# Patient Record
Sex: FEMALE | Race: WHITE | NOT HISPANIC OR LATINO
[De-identification: names, ages, dates, MRNs, and addresses within clinical notes are randomized per-mention and may not be internally consistent; named-entity substitution may affect disease eponyms.]

---

## 2017-09-14 ENCOUNTER — APPOINTMENT (OUTPATIENT)
Dept: OBGYN | Facility: CLINIC | Age: 47
End: 2017-09-14
Payer: COMMERCIAL

## 2017-09-14 VITALS
BODY MASS INDEX: 31.02 KG/M2 | SYSTOLIC BLOOD PRESSURE: 110 MMHG | DIASTOLIC BLOOD PRESSURE: 80 MMHG | HEIGHT: 66 IN | WEIGHT: 193 LBS

## 2017-09-14 PROCEDURE — 99396 PREV VISIT EST AGE 40-64: CPT

## 2018-10-05 ENCOUNTER — APPOINTMENT (OUTPATIENT)
Dept: OBGYN | Facility: CLINIC | Age: 48
End: 2018-10-05
Payer: COMMERCIAL

## 2018-10-05 VITALS
HEIGHT: 66 IN | SYSTOLIC BLOOD PRESSURE: 110 MMHG | WEIGHT: 217 LBS | BODY MASS INDEX: 34.87 KG/M2 | DIASTOLIC BLOOD PRESSURE: 70 MMHG

## 2018-10-05 DIAGNOSIS — Z12.4 ENCOUNTER FOR SCREENING FOR MALIGNANT NEOPLASM OF CERVIX: ICD-10-CM

## 2018-10-05 PROCEDURE — 99396 PREV VISIT EST AGE 40-64: CPT

## 2018-10-08 LAB — HPV HIGH+LOW RISK DNA PNL CVX: NOT DETECTED

## 2018-10-11 LAB — CYTOLOGY CVX/VAG DOC THIN PREP: NORMAL

## 2018-12-05 ENCOUNTER — APPOINTMENT (OUTPATIENT)
Dept: ULTRASOUND IMAGING | Facility: CLINIC | Age: 48
End: 2018-12-05

## 2018-12-05 ENCOUNTER — APPOINTMENT (OUTPATIENT)
Dept: MAMMOGRAPHY | Facility: CLINIC | Age: 48
End: 2018-12-05

## 2019-01-02 ENCOUNTER — FORM ENCOUNTER (OUTPATIENT)
Age: 49
End: 2019-01-02

## 2019-01-03 ENCOUNTER — TRANSCRIPTION ENCOUNTER (OUTPATIENT)
Age: 49
End: 2019-01-03

## 2019-01-03 ENCOUNTER — RESULT REVIEW (OUTPATIENT)
Age: 49
End: 2019-01-03

## 2019-01-03 ENCOUNTER — APPOINTMENT (OUTPATIENT)
Dept: MAMMOGRAPHY | Facility: CLINIC | Age: 49
End: 2019-01-03
Payer: COMMERCIAL

## 2019-01-03 ENCOUNTER — OUTPATIENT (OUTPATIENT)
Dept: OUTPATIENT SERVICES | Facility: HOSPITAL | Age: 49
LOS: 1 days | End: 2019-01-03
Payer: COMMERCIAL

## 2019-01-03 DIAGNOSIS — R92.1 MAMMOGRAPHIC CALCIFICATION FOUND ON DIAGNOSTIC IMAGING OF BREAST: ICD-10-CM

## 2019-01-03 PROCEDURE — 19081 BX BREAST 1ST LESION STRTCTC: CPT | Mod: RT

## 2019-01-03 PROCEDURE — 77065 DX MAMMO INCL CAD UNI: CPT

## 2019-01-03 PROCEDURE — A4648: CPT

## 2019-01-03 PROCEDURE — 19081 BX BREAST 1ST LESION STRTCTC: CPT

## 2019-01-03 PROCEDURE — 77065 DX MAMMO INCL CAD UNI: CPT | Mod: 26,RT

## 2019-01-09 ENCOUNTER — FORM ENCOUNTER (OUTPATIENT)
Age: 49
End: 2019-01-09

## 2019-01-09 ENCOUNTER — APPOINTMENT (OUTPATIENT)
Dept: BREAST CENTER | Facility: CLINIC | Age: 49
End: 2019-01-09
Payer: COMMERCIAL

## 2019-01-09 VITALS
DIASTOLIC BLOOD PRESSURE: 90 MMHG | HEART RATE: 76 BPM | HEIGHT: 66 IN | SYSTOLIC BLOOD PRESSURE: 150 MMHG | BODY MASS INDEX: 33.75 KG/M2 | WEIGHT: 210 LBS

## 2019-01-09 DIAGNOSIS — Z78.9 OTHER SPECIFIED HEALTH STATUS: ICD-10-CM

## 2019-01-09 DIAGNOSIS — Z87.891 PERSONAL HISTORY OF NICOTINE DEPENDENCE: ICD-10-CM

## 2019-01-09 DIAGNOSIS — F41.9 ANXIETY DISORDER, UNSPECIFIED: ICD-10-CM

## 2019-01-09 PROCEDURE — 99245 OFF/OP CONSLTJ NEW/EST HI 55: CPT

## 2019-01-09 RX ORDER — MULTIVITAMIN
TABLET ORAL DAILY
Refills: 0 | Status: ACTIVE | COMMUNITY

## 2019-01-09 NOTE — PAST MEDICAL HISTORY
[Menarche Age ____] : age at menarche was [unfilled] [Amenorrhea] : amenorrhea [Total Preg ___] : G[unfilled] [Live Births ___] : P[unfilled]  [Age At Live Birth ___] : Age at live birth: [unfilled] [FreeTextEntry1] : IUD [FreeTextEntry7] : Mirena IUD

## 2019-01-09 NOTE — CONSULT LETTER
[Dear  ___] : Dear ~PIERRE, [Consult Letter:] : I had the pleasure of evaluating your patient, [unfilled]. [Please see my note below.] : Please see my note below. [Sincerely,] : Sincerely, [FreeTextEntry2] : Gabbie Faust MD [FreeTextEntry3] : Kierra Campbell MD FACS\par

## 2019-01-09 NOTE — DATA REVIEWED
[FreeTextEntry1] : Mammogram: ( [facility])  11/05/18   Findings:  Extensive post reduction scarring bilaterally.  Increasing architectural distortion in the right breast at 11 and 12:00. Grouped punctate calcifications at 11:00  right breast.  Oval asymmetry Left breast centrally seen only on the MLO view.\par \par Gene. Diagnostic Mammogram:  11/30/18   Findings:  Heterogenous calcifications right breast 11:00 which are suspicious for malignancy.  The oval asymmetry of the left breast resolves with compression imaging.  \par  \par Breast ultrasound:  11/30/18  Findings:   Bilateral architectural distortion.  No discrete masses. \par \par Right stereotactic biopsy ( 11:00 at [facility]):  1/3/19   PATHOLOGY: Moderately differentiated infiltrating ductal carcinoma., DCIS cribriform and micropapillary patterns.  ER/SC/HER 2 pending.

## 2019-01-09 NOTE — HISTORY OF PRESENT ILLNESS
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  Stereotactic biopsy revealed an invasive ductal carcinoma.   The patient presents with her significant other for a breast surgical consultation.

## 2019-01-09 NOTE — PHYSICAL EXAM
[Normocephalic] : normocephalic [Atraumatic] : atraumatic [Sclera nonicteric] : sclera nonicteric [Conjunctiva pink] : conjunctiva pink [Supple] : supple [No Supraclavicular Adenopathy] : no supraclavicular adenopathy [No Cervical Adenopathy] : no cervical adenopathy [No Thyromegaly] : no thyromegaly [Normal Sinus Rhythm] : normal sinus rhythm [No Gallops] : no gallops [No Rubs] : no pericardial rub [Examined in the supine and seated position] : examined in the supine and seated position [Symmetrical] : symmetrical [Grade 3] : Ptosis Grade 3 [No dominant masses] : no dominant masses in right breast  [No dominant masses] : no dominant masses left breast [No Nipple Retraction] : no left nipple retraction [No Nipple Discharge] : no left nipple discharge [No Axillary Lymphadenopathy] : no left axillary lymphadenopathy [Soft] : abdomen soft [No Hepato-Splenomegaly] : no hepato-splenomegaly [No Edema] : no edema [No Rashes] : no rashes [No Ulceration] : no ulceration [Bra Size: ___] : Bra Size: [unfilled] [de-identified] : Bilateral reduction mammoplasty scars

## 2019-01-10 ENCOUNTER — OUTPATIENT (OUTPATIENT)
Dept: OUTPATIENT SERVICES | Facility: HOSPITAL | Age: 49
LOS: 1 days | End: 2019-01-10
Payer: COMMERCIAL

## 2019-01-10 ENCOUNTER — APPOINTMENT (OUTPATIENT)
Dept: MRI IMAGING | Facility: CLINIC | Age: 49
End: 2019-01-10
Payer: COMMERCIAL

## 2019-01-10 DIAGNOSIS — Z00.8 ENCOUNTER FOR OTHER GENERAL EXAMINATION: ICD-10-CM

## 2019-01-10 DIAGNOSIS — C50.411 MALIGNANT NEOPLASM OF UPPER-OUTER QUADRANT OF RIGHT FEMALE BREAST: ICD-10-CM

## 2019-01-10 PROCEDURE — C8937: CPT

## 2019-01-10 PROCEDURE — 77049 MRI BREAST C-+ W/CAD BI: CPT | Mod: 26

## 2019-01-10 PROCEDURE — C8908: CPT

## 2019-01-14 ENCOUNTER — APPOINTMENT (OUTPATIENT)
Dept: OBGYN | Facility: CLINIC | Age: 49
End: 2019-01-14
Payer: COMMERCIAL

## 2019-01-14 VITALS — SYSTOLIC BLOOD PRESSURE: 140 MMHG | DIASTOLIC BLOOD PRESSURE: 80 MMHG

## 2019-01-14 PROCEDURE — 58301 REMOVE INTRAUTERINE DEVICE: CPT

## 2019-01-14 PROCEDURE — 99213 OFFICE O/P EST LOW 20 MIN: CPT | Mod: 25

## 2019-01-14 NOTE — PROCEDURE
[IUD Removal] : IUD [Mirena] : Mirena [Patient] : patient [Speculum Placed] : a speculum was placed in the vagina [Strings Exposed - Forceps] : forceps were placed in the endocervical canal to expose the strings [Tolerated Well] : the patient tolerated the procedure well [No Complications] : none [de-identified] : breast cancer

## 2019-01-14 NOTE — CHIEF COMPLAINT
[Follow Up] : follow up GYN visit [FreeTextEntry1] : pt dx with moderately differentiated ductal carcinoma last week. ER and IA+. Has Mirena IUD and I have recommended that it be removed. She had genetic testing done alsready as well as breast MRI. SHe is deciding on her management at this time.\par Other options for bc dw pt. Options limited because has to be nonhormonal (barrier methods, copper IUD, sterilization). pt states boyfriend is willing to get vasectomy.

## 2019-01-16 ENCOUNTER — APPOINTMENT (OUTPATIENT)
Age: 49
End: 2019-01-16
Payer: COMMERCIAL

## 2019-01-16 PROCEDURE — 99215 OFFICE O/P EST HI 40 MIN: CPT

## 2019-01-16 NOTE — HISTORY OF PRESENT ILLNESS
[FreeTextEntry1] : Patient had a breast MRI on 1/10/19 which showed 5.5 cm area of right breast enhancement including the biopsy site which revealed invasive ductal carcinoma.  She saw Dr. Alaniz for a plastic surgical consultation and has decided to proceed with bilateral mastectomies with JASMYNE flap reconstruction. \par She is here with her boyfriend to discuss surgery in detail.

## 2019-01-16 NOTE — DATA REVIEWED
[FreeTextEntry1] : Mammogram: ( [facility])  11/05/18   Findings:  Extensive post reduction scarring bilaterally.  Increasing architectural distortion in the right breast at 11 and 12:00. Grouped punctate calcifications at 11:00  right breast.  Oval asymmetry Left breast centrally seen only on the MLO view.\par \par Gene. Diagnostic Mammogram:  11/30/18   Findings:  Heterogenous calcifications right breast 11:00 which are suspicious for malignancy.  The oval asymmetry of the left breast resolves with compression imaging.  \par  \par Breast ultrasound:  11/30/18  Findings:   Bilateral architectural distortion.  No discrete masses. \par \par Right stereotactic biopsy ( 11:00 at [facility]):  1/3/19   PATHOLOGY: Moderately differentiated infiltrating ductal carcinoma., DCIS cribriform and micropapillary patterns.  ER-.95%, [place] - 95%, HER2 - negative.\par \par MRI:  1/1019  Findings:  Irregular enhancement of the outer central right breast spanning up to 5.5 cm in AP dimension and 2.2 cm in the transverse dimension c/w areas of architectural distortion and calcifications on mammography.   N suspicious findings in the left breast.  No adenopathy.  9 mm T2 bright lesion in the liver.

## 2019-01-16 NOTE — CONSULT LETTER
[Dear  ___] : Dear ~PIERRE, [Consult Letter:] : I had the pleasure of evaluating your patient, [unfilled]. [Please see my note below.] : Please see my note below. [Consult Closing:] : Thank you very much for allowing me to participate in the care of this patient.  If you have any questions, please do not hesitate to contact me. [Sincerely,] : Sincerely, [FreeTextEntry2] : Shwetha Hyde MD [FreeTextEntry3] : Kierra Campbell MD FACS\par  [DrEileen  ___] : Dr. GREGORY [DrEileen ___] : Dr. GREGORY

## 2019-01-23 ENCOUNTER — RESULT REVIEW (OUTPATIENT)
Age: 49
End: 2019-01-23

## 2019-01-23 ENCOUNTER — APPOINTMENT (OUTPATIENT)
Dept: CT IMAGING | Facility: CLINIC | Age: 49
End: 2019-01-23
Payer: COMMERCIAL

## 2019-01-23 ENCOUNTER — OUTPATIENT (OUTPATIENT)
Dept: OUTPATIENT SERVICES | Facility: HOSPITAL | Age: 49
LOS: 1 days | Discharge: HOME | End: 2019-01-23
Payer: COMMERCIAL

## 2019-01-23 ENCOUNTER — OUTPATIENT (OUTPATIENT)
Dept: OUTPATIENT SERVICES | Facility: HOSPITAL | Age: 49
LOS: 1 days | End: 2019-01-23
Payer: COMMERCIAL

## 2019-01-23 DIAGNOSIS — Z00.8 ENCOUNTER FOR OTHER GENERAL EXAMINATION: ICD-10-CM

## 2019-01-23 DIAGNOSIS — C50.411 MALIGNANT NEOPLASM OF UPPER-OUTER QUADRANT OF RIGHT FEMALE BREAST: ICD-10-CM

## 2019-01-23 PROCEDURE — 74174 CTA ABD&PLVS W/CONTRAST: CPT

## 2019-01-23 PROCEDURE — 88321 CONSLTJ&REPRT SLD PREP ELSWR: CPT

## 2019-01-23 PROCEDURE — 74174 CTA ABD&PLVS W/CONTRAST: CPT | Mod: 26

## 2019-01-28 LAB — SURGICAL PATHOLOGY FINAL REPORT - CH: SIGNIFICANT CHANGE UP

## 2019-02-06 ENCOUNTER — OUTPATIENT (OUTPATIENT)
Dept: OUTPATIENT SERVICES | Facility: HOSPITAL | Age: 49
LOS: 1 days | Discharge: HOME | End: 2019-02-06
Payer: COMMERCIAL

## 2019-02-06 DIAGNOSIS — C50.411 MALIGNANT NEOPLASM OF UPPER-OUTER QUADRANT OF RIGHT FEMALE BREAST: ICD-10-CM

## 2019-02-06 DIAGNOSIS — Z98.890 OTHER SPECIFIED POSTPROCEDURAL STATES: Chronic | ICD-10-CM

## 2019-02-06 LAB
ALBUMIN SERPL ELPH-MCNC: 4.1 G/DL — SIGNIFICANT CHANGE UP (ref 3.3–5)
ALLERGY+IMMUNOLOGY DIAG STUDY NOTE: SIGNIFICANT CHANGE UP
ALP SERPL-CCNC: 87 U/L — SIGNIFICANT CHANGE UP (ref 40–120)
ALT FLD-CCNC: 24 U/L — SIGNIFICANT CHANGE UP (ref 12–78)
ANION GAP SERPL CALC-SCNC: 8 MMOL/L — SIGNIFICANT CHANGE UP (ref 5–17)
AST SERPL-CCNC: 19 U/L — SIGNIFICANT CHANGE UP (ref 15–37)
BASOPHILS # BLD AUTO: 0.03 K/UL — SIGNIFICANT CHANGE UP (ref 0–0.2)
BASOPHILS NFR BLD AUTO: 0.4 % — SIGNIFICANT CHANGE UP (ref 0–2)
BILIRUB DIRECT SERPL-MCNC: 0.1 MG/DL — SIGNIFICANT CHANGE UP (ref 0–0.2)
BILIRUB SERPL-MCNC: 0.6 MG/DL — SIGNIFICANT CHANGE UP (ref 0.2–1.2)
BLD GP AB SCN SERPL QL: ABNORMAL
BUN SERPL-MCNC: 13 MG/DL — SIGNIFICANT CHANGE UP (ref 7–23)
CALCIUM SERPL-MCNC: 8.4 MG/DL — LOW (ref 8.5–10.1)
CHLORIDE SERPL-SCNC: 105 MMOL/L — SIGNIFICANT CHANGE UP (ref 96–108)
CO2 SERPL-SCNC: 25 MMOL/L — SIGNIFICANT CHANGE UP (ref 22–31)
CREAT SERPL-MCNC: 0.68 MG/DL — SIGNIFICANT CHANGE UP (ref 0.5–1.3)
DIR ANTIGLOB POLYSPECIFIC INTERPRETATION: SIGNIFICANT CHANGE UP
EOSINOPHIL # BLD AUTO: 0.07 K/UL — SIGNIFICANT CHANGE UP (ref 0–0.5)
EOSINOPHIL NFR BLD AUTO: 1 % — SIGNIFICANT CHANGE UP (ref 0–6)
GLUCOSE SERPL-MCNC: 112 MG/DL — HIGH (ref 70–99)
HCT VFR BLD CALC: 39.7 % — SIGNIFICANT CHANGE UP (ref 34.5–45)
HGB BLD-MCNC: 13.5 G/DL — SIGNIFICANT CHANGE UP (ref 11.5–15.5)
IMM GRANULOCYTES NFR BLD AUTO: 0.3 % — SIGNIFICANT CHANGE UP (ref 0–1.5)
INR BLD: 1.08 RATIO — SIGNIFICANT CHANGE UP (ref 0.88–1.16)
LYMPHOCYTES # BLD AUTO: 1.1 K/UL — SIGNIFICANT CHANGE UP (ref 1–3.3)
LYMPHOCYTES # BLD AUTO: 15.4 % — SIGNIFICANT CHANGE UP (ref 13–44)
MCHC RBC-ENTMCNC: 31.7 PG — SIGNIFICANT CHANGE UP (ref 27–34)
MCHC RBC-ENTMCNC: 34 GM/DL — SIGNIFICANT CHANGE UP (ref 32–36)
MCV RBC AUTO: 93.2 FL — SIGNIFICANT CHANGE UP (ref 80–100)
MONOCYTES # BLD AUTO: 0.68 K/UL — SIGNIFICANT CHANGE UP (ref 0–0.9)
MONOCYTES NFR BLD AUTO: 9.5 % — SIGNIFICANT CHANGE UP (ref 2–14)
NEUTROPHILS # BLD AUTO: 5.24 K/UL — SIGNIFICANT CHANGE UP (ref 1.8–7.4)
NEUTROPHILS NFR BLD AUTO: 73.4 % — SIGNIFICANT CHANGE UP (ref 43–77)
NRBC # BLD: 0 /100 WBCS — SIGNIFICANT CHANGE UP (ref 0–0)
PLATELET # BLD AUTO: 225 K/UL — SIGNIFICANT CHANGE UP (ref 150–400)
POTASSIUM SERPL-MCNC: 4 MMOL/L — SIGNIFICANT CHANGE UP (ref 3.5–5.3)
POTASSIUM SERPL-SCNC: 4 MMOL/L — SIGNIFICANT CHANGE UP (ref 3.5–5.3)
PROT SERPL-MCNC: 7.6 GM/DL — SIGNIFICANT CHANGE UP (ref 6–8.3)
PROTHROM AB SERPL-ACNC: 12 SEC — SIGNIFICANT CHANGE UP (ref 10–12.9)
RBC # BLD: 4.26 M/UL — SIGNIFICANT CHANGE UP (ref 3.8–5.2)
RBC # FLD: 12 % — SIGNIFICANT CHANGE UP (ref 10.3–14.5)
SODIUM SERPL-SCNC: 138 MMOL/L — SIGNIFICANT CHANGE UP (ref 135–145)
TYPE + AB SCN PNL BLD: SIGNIFICANT CHANGE UP
WBC # BLD: 7.14 K/UL — SIGNIFICANT CHANGE UP (ref 3.8–10.5)
WBC # FLD AUTO: 7.14 K/UL — SIGNIFICANT CHANGE UP (ref 3.8–10.5)

## 2019-02-06 PROCEDURE — 93010 ELECTROCARDIOGRAM REPORT: CPT

## 2019-02-06 PROCEDURE — 86077 PHYS BLOOD BANK SERV XMATCH: CPT

## 2019-02-06 PROCEDURE — 71046 X-RAY EXAM CHEST 2 VIEWS: CPT | Mod: 26

## 2019-02-06 NOTE — CHART NOTE - NSCHARTNOTEFT_GEN_A_CORE
Plan  1. Stop all NSAIDS, herbal supplements and vitamins for 7 days.  2. NPO at midnight.  3. Use EZ sponges as directed  4. Labs, EKG, CXR as per surgeon. STAT urine pregnancy on admission.  5. PMD visit for optimization prior to surgery as per surgeon.    Vital signs:    BP  130/81  T 98.1  P 74  R 20  O2sat 99%

## 2019-02-07 LAB — ABO RH CONFIRMATION: SIGNIFICANT CHANGE UP

## 2019-02-12 PROBLEM — Z13.79 GENETIC TESTING: Status: RESOLVED | Noted: 2019-02-12 | Resolved: 2019-02-12

## 2019-02-12 RX ORDER — SODIUM CHLORIDE 9 MG/ML
3 INJECTION INTRAMUSCULAR; INTRAVENOUS; SUBCUTANEOUS EVERY 8 HOURS
Qty: 0 | Refills: 0 | Status: DISCONTINUED | OUTPATIENT
Start: 2019-02-13 | End: 2019-02-13

## 2019-02-12 RX ORDER — ACETAMINOPHEN 500 MG
975 TABLET ORAL ONCE
Qty: 0 | Refills: 0 | Status: COMPLETED | OUTPATIENT
Start: 2019-02-13 | End: 2019-02-13

## 2019-02-12 RX ORDER — FAMOTIDINE 10 MG/ML
20 INJECTION INTRAVENOUS ONCE
Qty: 0 | Refills: 0 | Status: COMPLETED | OUTPATIENT
Start: 2019-02-13 | End: 2019-02-13

## 2019-02-13 ENCOUNTER — RESULT REVIEW (OUTPATIENT)
Age: 49
End: 2019-02-13

## 2019-02-13 ENCOUNTER — INPATIENT (INPATIENT)
Facility: HOSPITAL | Age: 49
LOS: 3 days | Discharge: HOME | End: 2019-02-17
Attending: SURGERY | Admitting: SURGERY
Payer: COMMERCIAL

## 2019-02-13 ENCOUNTER — APPOINTMENT (OUTPATIENT)
Dept: BREAST CENTER | Facility: HOSPITAL | Age: 49
End: 2019-02-13
Payer: COMMERCIAL

## 2019-02-13 VITALS
OXYGEN SATURATION: 98 % | WEIGHT: 220.9 LBS | SYSTOLIC BLOOD PRESSURE: 140 MMHG | RESPIRATION RATE: 16 BRPM | HEART RATE: 82 BPM | HEIGHT: 66 IN | DIASTOLIC BLOOD PRESSURE: 79 MMHG | TEMPERATURE: 98 F

## 2019-02-13 DIAGNOSIS — Z13.79 ENCOUNTER FOR OTHER SCREENING FOR GENETIC AND CHROMOSOMAL ANOMALIES: ICD-10-CM

## 2019-02-13 DIAGNOSIS — Z98.890 OTHER SPECIFIED POSTPROCEDURAL STATES: Chronic | ICD-10-CM

## 2019-02-13 LAB
ANION GAP SERPL CALC-SCNC: 12 MMOL/L — SIGNIFICANT CHANGE UP (ref 5–17)
BUN SERPL-MCNC: 10 MG/DL — SIGNIFICANT CHANGE UP (ref 7–23)
CALCIUM SERPL-MCNC: 7.8 MG/DL — LOW (ref 8.5–10.1)
CHLORIDE SERPL-SCNC: 105 MMOL/L — SIGNIFICANT CHANGE UP (ref 96–108)
CO2 SERPL-SCNC: 20 MMOL/L — LOW (ref 22–31)
CREAT SERPL-MCNC: 1.01 MG/DL — SIGNIFICANT CHANGE UP (ref 0.5–1.3)
GLUCOSE SERPL-MCNC: 144 MG/DL — HIGH (ref 70–99)
HCG UR QL: NEGATIVE — SIGNIFICANT CHANGE UP
HCT VFR BLD CALC: 34.2 % — LOW (ref 34.5–45)
HGB BLD-MCNC: 11.7 G/DL — SIGNIFICANT CHANGE UP (ref 11.5–15.5)
MCHC RBC-ENTMCNC: 32.1 PG — SIGNIFICANT CHANGE UP (ref 27–34)
MCHC RBC-ENTMCNC: 34.2 GM/DL — SIGNIFICANT CHANGE UP (ref 32–36)
MCV RBC AUTO: 93.7 FL — SIGNIFICANT CHANGE UP (ref 80–100)
NRBC # BLD: 0 /100 WBCS — SIGNIFICANT CHANGE UP (ref 0–0)
PLATELET # BLD AUTO: 247 K/UL — SIGNIFICANT CHANGE UP (ref 150–400)
POTASSIUM SERPL-MCNC: 4 MMOL/L — SIGNIFICANT CHANGE UP (ref 3.5–5.3)
POTASSIUM SERPL-SCNC: 4 MMOL/L — SIGNIFICANT CHANGE UP (ref 3.5–5.3)
RBC # BLD: 3.65 M/UL — LOW (ref 3.8–5.2)
RBC # FLD: 11.9 % — SIGNIFICANT CHANGE UP (ref 10.3–14.5)
SODIUM SERPL-SCNC: 137 MMOL/L — SIGNIFICANT CHANGE UP (ref 135–145)
WBC # BLD: 18.09 K/UL — HIGH (ref 3.8–10.5)
WBC # FLD AUTO: 18.09 K/UL — HIGH (ref 3.8–10.5)

## 2019-02-13 PROCEDURE — 88307 TISSUE EXAM BY PATHOLOGIST: CPT | Mod: 26

## 2019-02-13 PROCEDURE — 19303 MAST SIMPLE COMPLETE: CPT | Mod: RT

## 2019-02-13 PROCEDURE — 19303 MAST SIMPLE COMPLETE: CPT | Mod: AS,LT

## 2019-02-13 PROCEDURE — 88334 PATH CONSLTJ SURG CYTO XM EA: CPT | Mod: 26,59

## 2019-02-13 PROCEDURE — 88331 PATH CONSLTJ SURG 1 BLK 1SPC: CPT | Mod: 26

## 2019-02-13 PROCEDURE — 88300 SURGICAL PATH GROSS: CPT | Mod: 26,59

## 2019-02-13 PROCEDURE — 88360 TUMOR IMMUNOHISTOCHEM/MANUAL: CPT | Mod: 26,59

## 2019-02-13 PROCEDURE — 38525 BIOPSY/REMOVAL LYMPH NODES: CPT | Mod: RT,59

## 2019-02-13 PROCEDURE — 88304 TISSUE EXAM BY PATHOLOGIST: CPT | Mod: 26

## 2019-02-13 PROCEDURE — 38792 RA TRACER ID OF SENTINL NODE: CPT | Mod: RT

## 2019-02-13 PROCEDURE — 88305 TISSUE EXAM BY PATHOLOGIST: CPT | Mod: 26

## 2019-02-13 PROCEDURE — 38745 REMOVE ARMPIT LYMPH NODES: CPT | Mod: RT,59

## 2019-02-13 RX ORDER — FENTANYL CITRATE 50 UG/ML
50 INJECTION INTRAVENOUS
Qty: 0 | Refills: 0 | Status: DISCONTINUED | OUTPATIENT
Start: 2019-02-13 | End: 2019-02-14

## 2019-02-13 RX ORDER — HYDROMORPHONE HYDROCHLORIDE 2 MG/ML
0.5 INJECTION INTRAMUSCULAR; INTRAVENOUS; SUBCUTANEOUS
Qty: 0 | Refills: 0 | Status: DISCONTINUED | OUTPATIENT
Start: 2019-02-13 | End: 2019-02-14

## 2019-02-13 RX ORDER — ACETAMINOPHEN 500 MG
650 TABLET ORAL EVERY 6 HOURS
Qty: 0 | Refills: 0 | Status: DISCONTINUED | OUTPATIENT
Start: 2019-02-15 | End: 2019-02-17

## 2019-02-13 RX ORDER — ONDANSETRON 8 MG/1
4 TABLET, FILM COATED ORAL ONCE
Qty: 0 | Refills: 0 | Status: COMPLETED | OUTPATIENT
Start: 2019-02-13 | End: 2019-02-13

## 2019-02-13 RX ORDER — OXYCODONE HYDROCHLORIDE 5 MG/1
10 TABLET ORAL EVERY 4 HOURS
Qty: 0 | Refills: 0 | Status: DISCONTINUED | OUTPATIENT
Start: 2019-02-13 | End: 2019-02-17

## 2019-02-13 RX ORDER — KETOROLAC TROMETHAMINE 30 MG/ML
15 SYRINGE (ML) INJECTION EVERY 6 HOURS
Qty: 0 | Refills: 0 | Status: DISCONTINUED | OUTPATIENT
Start: 2019-02-14 | End: 2019-02-14

## 2019-02-13 RX ORDER — SODIUM CHLORIDE 9 MG/ML
1000 INJECTION, SOLUTION INTRAVENOUS
Qty: 0 | Refills: 0 | Status: DISCONTINUED | OUTPATIENT
Start: 2019-02-13 | End: 2019-02-14

## 2019-02-13 RX ORDER — DOCUSATE SODIUM 100 MG
100 CAPSULE ORAL THREE TIMES A DAY
Qty: 0 | Refills: 0 | Status: DISCONTINUED | OUTPATIENT
Start: 2019-02-13 | End: 2019-02-17

## 2019-02-13 RX ORDER — ENOXAPARIN SODIUM 100 MG/ML
40 INJECTION SUBCUTANEOUS EVERY 24 HOURS
Qty: 0 | Refills: 0 | Status: DISCONTINUED | OUTPATIENT
Start: 2019-02-14 | End: 2019-02-17

## 2019-02-13 RX ORDER — IBUPROFEN 200 MG
400 TABLET ORAL EVERY 8 HOURS
Qty: 0 | Refills: 0 | Status: DISCONTINUED | OUTPATIENT
Start: 2019-02-14 | End: 2019-02-17

## 2019-02-13 RX ORDER — ACETAMINOPHEN 500 MG
1000 TABLET ORAL ONCE
Qty: 0 | Refills: 0 | Status: COMPLETED | OUTPATIENT
Start: 2019-02-14 | End: 2019-02-14

## 2019-02-13 RX ORDER — HYDROMORPHONE HYDROCHLORIDE 2 MG/ML
0.5 INJECTION INTRAMUSCULAR; INTRAVENOUS; SUBCUTANEOUS EVERY 4 HOURS
Qty: 0 | Refills: 0 | Status: DISCONTINUED | OUTPATIENT
Start: 2019-02-13 | End: 2019-02-17

## 2019-02-13 RX ORDER — OXYCODONE HYDROCHLORIDE 5 MG/1
5 TABLET ORAL EVERY 4 HOURS
Qty: 0 | Refills: 0 | Status: DISCONTINUED | OUTPATIENT
Start: 2019-02-13 | End: 2019-02-17

## 2019-02-13 RX ORDER — CEFAZOLIN SODIUM 1 G
2000 VIAL (EA) INJECTION EVERY 8 HOURS
Qty: 0 | Refills: 0 | Status: COMPLETED | OUTPATIENT
Start: 2019-02-13 | End: 2019-02-14

## 2019-02-13 RX ADMIN — FAMOTIDINE 20 MILLIGRAM(S): 10 INJECTION INTRAVENOUS at 07:00

## 2019-02-13 RX ADMIN — HYDROMORPHONE HYDROCHLORIDE 0.5 MILLIGRAM(S): 2 INJECTION INTRAMUSCULAR; INTRAVENOUS; SUBCUTANEOUS at 20:40

## 2019-02-13 RX ADMIN — Medication 975 MILLIGRAM(S): at 07:00

## 2019-02-13 RX ADMIN — HYDROMORPHONE HYDROCHLORIDE 0.5 MILLIGRAM(S): 2 INJECTION INTRAMUSCULAR; INTRAVENOUS; SUBCUTANEOUS at 22:29

## 2019-02-13 RX ADMIN — Medication 100 MILLIGRAM(S): at 22:52

## 2019-02-13 RX ADMIN — ONDANSETRON 4 MILLIGRAM(S): 8 TABLET, FILM COATED ORAL at 20:40

## 2019-02-13 RX ADMIN — HYDROMORPHONE HYDROCHLORIDE 0.5 MILLIGRAM(S): 2 INJECTION INTRAMUSCULAR; INTRAVENOUS; SUBCUTANEOUS at 22:28

## 2019-02-13 RX ADMIN — SODIUM CHLORIDE 125 MILLILITER(S): 9 INJECTION, SOLUTION INTRAVENOUS at 21:00

## 2019-02-13 RX ADMIN — Medication 975 MILLIGRAM(S): at 07:01

## 2019-02-13 RX ADMIN — HYDROMORPHONE HYDROCHLORIDE 0.5 MILLIGRAM(S): 2 INJECTION INTRAMUSCULAR; INTRAVENOUS; SUBCUTANEOUS at 21:00

## 2019-02-13 NOTE — BRIEF OPERATIVE NOTE - PROCEDURE
<<-----Click on this checkbox to enter Procedure Mastectomy of left breast  02/13/2019  nipple sparing /prophylactic  Active  [email or web address]  Mastectomy with axillary sentinel node biopsy  02/13/2019  and axillary dissection  - right/nipple sparing  Active  [email or web address]

## 2019-02-13 NOTE — BRIEF OPERATIVE NOTE - PROCEDURE
<<-----Click on this checkbox to enter Procedure Breast reconstruction  02/13/2019  Bilateral breast reconstruction with JASMYNE flap and right lymphovenous anastamosis  Active  MADIHAO

## 2019-02-13 NOTE — BRIEF OPERATIVE NOTE - PRE-OP DX
Invasive ductal carcinoma of breast, female, right  02/13/2019  ER+,[place]+,HER2-  Active  Fred Thomas
Invasive ductal carcinoma of breast, female, right  02/13/2019  ER+,[place]+,HER2-  Active  Fred Thomas

## 2019-02-13 NOTE — BRIEF OPERATIVE NOTE - POST-OP DX
Invasive ductal carcinoma of breast, female, right  02/13/2019  ER+,[place]+,HER2-  Active  Fred Thomas
Invasive ductal carcinoma of breast, female, right  02/13/2019  ER+,[place]+,HER2-  Active  Fred Thomas

## 2019-02-13 NOTE — BRIEF OPERATIVE NOTE - OPERATION/FINDINGS
see op note
right axillary sentinel node and right axillary non-sentinel node - postitive on intraoperative pathology consultation

## 2019-02-13 NOTE — BRIEF OPERATIVE NOTE - SPECIMENS
none
left breast , left intranipple tissue, right breast, right intranipple tissue, right axillary sentinel node x1 , right axillary non sentinel node x1 , left breast lipoma

## 2019-02-14 LAB
ANION GAP SERPL CALC-SCNC: 8 MMOL/L — SIGNIFICANT CHANGE UP (ref 5–17)
BUN SERPL-MCNC: 10 MG/DL — SIGNIFICANT CHANGE UP (ref 7–23)
CALCIUM SERPL-MCNC: 7.6 MG/DL — LOW (ref 8.5–10.1)
CHLORIDE SERPL-SCNC: 106 MMOL/L — SIGNIFICANT CHANGE UP (ref 96–108)
CO2 SERPL-SCNC: 24 MMOL/L — SIGNIFICANT CHANGE UP (ref 22–31)
CREAT SERPL-MCNC: 0.75 MG/DL — SIGNIFICANT CHANGE UP (ref 0.5–1.3)
GLUCOSE SERPL-MCNC: 118 MG/DL — HIGH (ref 70–99)
HCT VFR BLD CALC: 28.7 % — LOW (ref 34.5–45)
HGB BLD-MCNC: 9.9 G/DL — LOW (ref 11.5–15.5)
MCHC RBC-ENTMCNC: 31.8 PG — SIGNIFICANT CHANGE UP (ref 27–34)
MCHC RBC-ENTMCNC: 34.5 GM/DL — SIGNIFICANT CHANGE UP (ref 32–36)
MCV RBC AUTO: 92.3 FL — SIGNIFICANT CHANGE UP (ref 80–100)
NRBC # BLD: 0 /100 WBCS — SIGNIFICANT CHANGE UP (ref 0–0)
PLATELET # BLD AUTO: 202 K/UL — SIGNIFICANT CHANGE UP (ref 150–400)
POTASSIUM SERPL-MCNC: 3.9 MMOL/L — SIGNIFICANT CHANGE UP (ref 3.5–5.3)
POTASSIUM SERPL-SCNC: 3.9 MMOL/L — SIGNIFICANT CHANGE UP (ref 3.5–5.3)
RBC # BLD: 3.11 M/UL — LOW (ref 3.8–5.2)
RBC # FLD: 12.2 % — SIGNIFICANT CHANGE UP (ref 10.3–14.5)
SODIUM SERPL-SCNC: 138 MMOL/L — SIGNIFICANT CHANGE UP (ref 135–145)
WBC # BLD: 10.18 K/UL — SIGNIFICANT CHANGE UP (ref 3.8–10.5)
WBC # FLD AUTO: 10.18 K/UL — SIGNIFICANT CHANGE UP (ref 3.8–10.5)

## 2019-02-14 RX ORDER — FENTANYL CITRATE 50 UG/ML
50 INJECTION INTRAVENOUS
Qty: 0 | Refills: 0 | Status: DISCONTINUED | OUTPATIENT
Start: 2019-02-15 | End: 2019-02-15

## 2019-02-14 RX ORDER — OXYCODONE HYDROCHLORIDE 5 MG/1
10 TABLET ORAL ONCE
Qty: 0 | Refills: 0 | Status: DISCONTINUED | OUTPATIENT
Start: 2019-02-14 | End: 2019-02-15

## 2019-02-14 RX ORDER — ONDANSETRON 8 MG/1
4 TABLET, FILM COATED ORAL ONCE
Qty: 0 | Refills: 0 | Status: COMPLETED | OUTPATIENT
Start: 2019-02-14 | End: 2019-02-14

## 2019-02-14 RX ORDER — SODIUM CHLORIDE 9 MG/ML
1000 INJECTION, SOLUTION INTRAVENOUS
Qty: 0 | Refills: 0 | Status: DISCONTINUED | OUTPATIENT
Start: 2019-02-14 | End: 2019-02-14

## 2019-02-14 RX ORDER — ACETAMINOPHEN 500 MG
1000 TABLET ORAL ONCE
Qty: 0 | Refills: 0 | Status: COMPLETED | OUTPATIENT
Start: 2019-02-14 | End: 2019-02-14

## 2019-02-14 RX ORDER — SODIUM CHLORIDE 9 MG/ML
1000 INJECTION, SOLUTION INTRAVENOUS
Qty: 0 | Refills: 0 | Status: DISCONTINUED | OUTPATIENT
Start: 2019-02-14 | End: 2019-02-15

## 2019-02-14 RX ORDER — OXYCODONE HYDROCHLORIDE 5 MG/1
10 TABLET ORAL ONCE
Qty: 0 | Refills: 0 | Status: DISCONTINUED | OUTPATIENT
Start: 2019-02-15 | End: 2019-02-15

## 2019-02-14 RX ORDER — ONDANSETRON 8 MG/1
4 TABLET, FILM COATED ORAL ONCE
Qty: 0 | Refills: 0 | Status: DISCONTINUED | OUTPATIENT
Start: 2019-02-15 | End: 2019-02-15

## 2019-02-14 RX ORDER — HYDROMORPHONE HYDROCHLORIDE 2 MG/ML
0.5 INJECTION INTRAMUSCULAR; INTRAVENOUS; SUBCUTANEOUS
Qty: 0 | Refills: 0 | Status: DISCONTINUED | OUTPATIENT
Start: 2019-02-14 | End: 2019-02-15

## 2019-02-14 RX ORDER — HYDROMORPHONE HYDROCHLORIDE 2 MG/ML
0.5 INJECTION INTRAMUSCULAR; INTRAVENOUS; SUBCUTANEOUS
Qty: 0 | Refills: 0 | Status: DISCONTINUED | OUTPATIENT
Start: 2019-02-15 | End: 2019-02-15

## 2019-02-14 RX ADMIN — OXYCODONE HYDROCHLORIDE 5 MILLIGRAM(S): 5 TABLET ORAL at 06:21

## 2019-02-14 RX ADMIN — HYDROMORPHONE HYDROCHLORIDE 0.5 MILLIGRAM(S): 2 INJECTION INTRAMUSCULAR; INTRAVENOUS; SUBCUTANEOUS at 21:30

## 2019-02-14 RX ADMIN — Medication 400 MILLIGRAM(S): at 11:36

## 2019-02-14 RX ADMIN — ENOXAPARIN SODIUM 40 MILLIGRAM(S): 100 INJECTION SUBCUTANEOUS at 14:11

## 2019-02-14 RX ADMIN — Medication 15 MILLIGRAM(S): at 01:26

## 2019-02-14 RX ADMIN — HYDROMORPHONE HYDROCHLORIDE 0.5 MILLIGRAM(S): 2 INJECTION INTRAMUSCULAR; INTRAVENOUS; SUBCUTANEOUS at 20:30

## 2019-02-14 RX ADMIN — ONDANSETRON 4 MILLIGRAM(S): 8 TABLET, FILM COATED ORAL at 21:10

## 2019-02-14 RX ADMIN — Medication 0.5 MILLIGRAM(S): at 21:24

## 2019-02-14 RX ADMIN — OXYCODONE HYDROCHLORIDE 10 MILLIGRAM(S): 5 TABLET ORAL at 12:30

## 2019-02-14 RX ADMIN — Medication 0.5 MILLIGRAM(S): at 00:00

## 2019-02-14 RX ADMIN — Medication 15 MILLIGRAM(S): at 06:19

## 2019-02-14 RX ADMIN — HYDROMORPHONE HYDROCHLORIDE 0.5 MILLIGRAM(S): 2 INJECTION INTRAMUSCULAR; INTRAVENOUS; SUBCUTANEOUS at 23:26

## 2019-02-14 RX ADMIN — Medication 1000 MILLIGRAM(S): at 01:25

## 2019-02-14 RX ADMIN — HYDROMORPHONE HYDROCHLORIDE 0.5 MILLIGRAM(S): 2 INJECTION INTRAMUSCULAR; INTRAVENOUS; SUBCUTANEOUS at 22:00

## 2019-02-14 RX ADMIN — SODIUM CHLORIDE 125 MILLILITER(S): 9 INJECTION, SOLUTION INTRAVENOUS at 06:21

## 2019-02-14 RX ADMIN — Medication 100 MILLIGRAM(S): at 06:18

## 2019-02-14 RX ADMIN — SODIUM CHLORIDE 75 MILLILITER(S): 9 INJECTION, SOLUTION INTRAVENOUS at 23:29

## 2019-02-14 RX ADMIN — Medication 100 MILLIGRAM(S): at 08:42

## 2019-02-14 RX ADMIN — Medication 400 MILLIGRAM(S): at 01:25

## 2019-02-14 RX ADMIN — HYDROMORPHONE HYDROCHLORIDE 0.5 MILLIGRAM(S): 2 INJECTION INTRAMUSCULAR; INTRAVENOUS; SUBCUTANEOUS at 20:45

## 2019-02-14 RX ADMIN — Medication 400 MILLIGRAM(S): at 08:42

## 2019-02-14 RX ADMIN — Medication 400 MILLIGRAM(S): at 23:01

## 2019-02-14 RX ADMIN — Medication 100 MILLIGRAM(S): at 14:11

## 2019-02-14 RX ADMIN — ONDANSETRON 4 MILLIGRAM(S): 8 TABLET, FILM COATED ORAL at 06:55

## 2019-02-14 RX ADMIN — Medication 400 MILLIGRAM(S): at 23:04

## 2019-02-14 RX ADMIN — Medication 100 MILLIGRAM(S): at 23:32

## 2019-02-14 RX ADMIN — OXYCODONE HYDROCHLORIDE 5 MILLIGRAM(S): 5 TABLET ORAL at 15:00

## 2019-02-14 RX ADMIN — OXYCODONE HYDROCHLORIDE 10 MILLIGRAM(S): 5 TABLET ORAL at 11:35

## 2019-02-14 NOTE — PROGRESS NOTE ADULT - SUBJECTIVE AND OBJECTIVE BOX
Doing well  Reports mild abdominal pain        MEDICATIONS  (STANDING):  acetaminophen  IVPB .. 1000 milliGRAM(s) IV Intermittent once  docusate sodium 100 milliGRAM(s) Oral three times a day  enoxaparin Injectable 40 milliGRAM(s) SubCutaneous every 24 hours  ibuprofen  Tablet. 400 milliGRAM(s) Oral every 8 hours  lactated ringers. 1000 milliLiter(s) (75 mL/Hr) IV Continuous <Continuous>    MEDICATIONS  (PRN):  fentaNYL    Injectable 50 MICROGram(s) IV Push every 5 minutes PRN Moderate Pain (4 - 6)  HYDROmorphone  Injectable 0.5 milliGRAM(s) IV Push every 4 hours PRN Severe Pain (7 - 10)  oxyCODONE    IR 5 milliGRAM(s) Oral every 4 hours PRN Mild Pain (1 - 3)  oxyCODONE    IR 10 milliGRAM(s) Oral every 4 hours PRN Moderate Pain (4 - 6)      Vital Signs Last 24 Hrs  T(C): 36.3 (14 Feb 2019 06:00), Max: 36.9 (14 Feb 2019 00:01)  T(F): 97.4 (14 Feb 2019 06:00), Max: 98.5 (14 Feb 2019 00:01)  HR: 106 (14 Feb 2019 06:00) (106 - 130)  BP: 118/60 (14 Feb 2019 06:00) (116/81 - 148/73)  BP(mean): --  RR: 18 (14 Feb 2019 06:00) (15 - 20)  SpO2: 100% (14 Feb 2019 06:00) (100% - 100%)    I&O's Detail    13 Feb 2019 07:01  -  14 Feb 2019 07:00  --------------------------------------------------------  IN:    lactated ringers.: 1375 mL    Other: 2100 mL  Total IN: 3475 mL    OUT:    Bulb: 20 mL    Bulb: 30 mL    Bulb: 63 mL    Bulb: 15 mL    Bulb: 18 mL    Bulb: 18 mL    Indwelling Catheter - Urethral: 1235 mL  Total OUT: 1399 mL    Total NET: 2076 mL        Exam  Breasts: Incisions clean, dry and intact.  No collections.  No erythema.  Skin viable.    Flaps:  Soft.  Capillary refill 2-3 seconds.  Viable  Viopitx:  L60  R61    Abdomen:  Incision clean, dry and intact.   No collections.  No erythema.  Skin viable.      Assessment and Plan    Transfer to [address] [place] [address]  Ambulate  ADAT  q2hr flap checks.

## 2019-02-14 NOTE — PROGRESS NOTE ADULT - SUBJECTIVE AND OBJECTIVE BOX
Called by RN for difficulty obtaining viotix reading  Reading dropped after patient sat up in a chair, then were restored after placed back in bed.  Readings then began to drop some time after that per nursing.  Flap soft and warm  + doppler and cap refill, though doppler sound quality and wave form diminished  Discussed options  Recommended return to OR for exploration and evaluation  Patient agreed and consented.  Return to OR urgently.

## 2019-02-14 NOTE — PROGRESS NOTE ADULT - SUBJECTIVE AND OBJECTIVE BOX
SURGICAL PRIGRESS NOTE     STATUS POST:  Bilateral nipple sparing mastectomies with JASMYNE flap reconstruction,  right axillary sentinel node biopsy and completion axillary node dissection. Lympha procedure    POST OPERATIVE DAY #: 1    Vital Signs Last 24 Hrs  T(C): 36.5 (14 Feb 2019 07:57), Max: 36.9 (14 Feb 2019 00:01)  T(F): 97.7 (14 Feb 2019 07:57), Max: 98.5 (14 Feb 2019 00:01)  HR: 107 (14 Feb 2019 07:57) (106 - 130)  BP: 108/61 (14 Feb 2019 07:57) (108/61 - 148/73)  BP(mean): --  RR: 18 (14 Feb 2019 07:57) (15 - 20)  SpO2: 99% (14 Feb 2019 07:57) (99% - 100%)      SUBJECTIVE:     I&O's Detail    13 Feb 2019 07:01  -  14 Feb 2019 07:00  --------------------------------------------------------  IN:    lactated ringers.: 1375 mL    Other: 2100 mL  Total IN: 3475 mL    OUT:    Bulb: 20 mL    Bulb: 30 mL    Bulb: 63 mL    Bulb: 15 mL    Bulb: 18 mL    Bulb: 18 mL    Indwelling Catheter - Urethral: 1235 mL  Total OUT: 1399 mL    Total NET: 2076 mL          MEDICATIONS  (STANDING):  acetaminophen  IVPB .. 1000 milliGRAM(s) IV Intermittent once  docusate sodium 100 milliGRAM(s) Oral three times a day  enoxaparin Injectable 40 milliGRAM(s) SubCutaneous every 24 hours  ibuprofen  Tablet. 400 milliGRAM(s) Oral every 8 hours  lactated ringers. 1000 milliLiter(s) (75 mL/Hr) IV Continuous <Continuous>    MEDICATIONS  (PRN):  HYDROmorphone  Injectable 0.5 milliGRAM(s) IV Push every 4 hours PRN Severe Pain (7 - 10)  oxyCODONE    IR 5 milliGRAM(s) Oral every 4 hours PRN Mild Pain (1 - 3)  oxyCODONE    IR 10 milliGRAM(s) Oral every 4 hours PRN Moderate Pain (4 - 6)      LABS:                        9.9    10.18 )-----------( 202      ( 14 Feb 2019 08:21 )             28.7     02-14    138  |  106  |  10  ----------------------------<  118<H>  3.9   |  24  |  0.75    Ca    7.6<L>      14 Feb 2019 08:21

## 2019-02-14 NOTE — PROGRESS NOTE ADULT - BREASTS COMMENTS
Bilateral mastectomy flaps soft.  Nipple areola complexes viable. Vioptix - 63% bilatearlly. Mild ecchymosis.

## 2019-02-15 ENCOUNTER — TRANSCRIPTION ENCOUNTER (OUTPATIENT)
Age: 49
End: 2019-02-15

## 2019-02-15 LAB
ANION GAP SERPL CALC-SCNC: 5 MMOL/L — SIGNIFICANT CHANGE UP (ref 5–17)
BUN SERPL-MCNC: 5 MG/DL — LOW (ref 7–23)
CALCIUM SERPL-MCNC: 8.1 MG/DL — LOW (ref 8.5–10.1)
CHLORIDE SERPL-SCNC: 105 MMOL/L — SIGNIFICANT CHANGE UP (ref 96–108)
CO2 SERPL-SCNC: 25 MMOL/L — SIGNIFICANT CHANGE UP (ref 22–31)
CREAT SERPL-MCNC: 0.48 MG/DL — LOW (ref 0.5–1.3)
GLUCOSE SERPL-MCNC: 115 MG/DL — HIGH (ref 70–99)
HCT VFR BLD CALC: 25 % — LOW (ref 34.5–45)
HGB BLD-MCNC: 8.7 G/DL — LOW (ref 11.5–15.5)
MCHC RBC-ENTMCNC: 32.8 PG — SIGNIFICANT CHANGE UP (ref 27–34)
MCHC RBC-ENTMCNC: 34.8 GM/DL — SIGNIFICANT CHANGE UP (ref 32–36)
MCV RBC AUTO: 94.3 FL — SIGNIFICANT CHANGE UP (ref 80–100)
NRBC # BLD: 0 /100 WBCS — SIGNIFICANT CHANGE UP (ref 0–0)
PLATELET # BLD AUTO: 173 K/UL — SIGNIFICANT CHANGE UP (ref 150–400)
POTASSIUM SERPL-MCNC: 4 MMOL/L — SIGNIFICANT CHANGE UP (ref 3.5–5.3)
POTASSIUM SERPL-SCNC: 4 MMOL/L — SIGNIFICANT CHANGE UP (ref 3.5–5.3)
RBC # BLD: 2.65 M/UL — LOW (ref 3.8–5.2)
RBC # FLD: 12.3 % — SIGNIFICANT CHANGE UP (ref 10.3–14.5)
SODIUM SERPL-SCNC: 135 MMOL/L — SIGNIFICANT CHANGE UP (ref 135–145)
WBC # BLD: 12.22 K/UL — HIGH (ref 3.8–10.5)
WBC # FLD AUTO: 12.22 K/UL — HIGH (ref 3.8–10.5)

## 2019-02-15 RX ORDER — SODIUM CHLORIDE 9 MG/ML
1000 INJECTION, SOLUTION INTRAVENOUS
Qty: 0 | Refills: 0 | Status: DISCONTINUED | OUTPATIENT
Start: 2019-02-15 | End: 2019-02-15

## 2019-02-15 RX ORDER — ZOLPIDEM TARTRATE 10 MG/1
5 TABLET ORAL AT BEDTIME
Qty: 0 | Refills: 0 | Status: DISCONTINUED | OUTPATIENT
Start: 2019-02-15 | End: 2019-02-17

## 2019-02-15 RX ADMIN — Medication 100 MILLIGRAM(S): at 05:17

## 2019-02-15 RX ADMIN — Medication 650 MILLIGRAM(S): at 21:11

## 2019-02-15 RX ADMIN — OXYCODONE HYDROCHLORIDE 5 MILLIGRAM(S): 5 TABLET ORAL at 11:27

## 2019-02-15 RX ADMIN — Medication 650 MILLIGRAM(S): at 20:24

## 2019-02-15 RX ADMIN — Medication 650 MILLIGRAM(S): at 14:50

## 2019-02-15 RX ADMIN — Medication 400 MILLIGRAM(S): at 20:23

## 2019-02-15 RX ADMIN — Medication 100 MILLIGRAM(S): at 14:20

## 2019-02-15 RX ADMIN — OXYCODONE HYDROCHLORIDE 10 MILLIGRAM(S): 5 TABLET ORAL at 23:46

## 2019-02-15 RX ADMIN — Medication 400 MILLIGRAM(S): at 00:00

## 2019-02-15 RX ADMIN — Medication 400 MILLIGRAM(S): at 11:45

## 2019-02-15 RX ADMIN — HYDROMORPHONE HYDROCHLORIDE 0.5 MILLIGRAM(S): 2 INJECTION INTRAMUSCULAR; INTRAVENOUS; SUBCUTANEOUS at 00:00

## 2019-02-15 RX ADMIN — ENOXAPARIN SODIUM 40 MILLIGRAM(S): 100 INJECTION SUBCUTANEOUS at 14:20

## 2019-02-15 RX ADMIN — Medication 650 MILLIGRAM(S): at 08:28

## 2019-02-15 RX ADMIN — OXYCODONE HYDROCHLORIDE 5 MILLIGRAM(S): 5 TABLET ORAL at 11:45

## 2019-02-15 RX ADMIN — Medication 400 MILLIGRAM(S): at 05:17

## 2019-02-15 RX ADMIN — Medication 400 MILLIGRAM(S): at 11:27

## 2019-02-15 RX ADMIN — Medication 100 MILLIGRAM(S): at 22:51

## 2019-02-15 RX ADMIN — Medication 650 MILLIGRAM(S): at 05:13

## 2019-02-15 RX ADMIN — OXYCODONE HYDROCHLORIDE 10 MILLIGRAM(S): 5 TABLET ORAL at 22:51

## 2019-02-15 RX ADMIN — Medication 400 MILLIGRAM(S): at 21:11

## 2019-02-15 RX ADMIN — Medication 650 MILLIGRAM(S): at 14:20

## 2019-02-15 RX ADMIN — Medication 400 MILLIGRAM(S): at 05:13

## 2019-02-15 NOTE — DISCHARGE NOTE ADULT - CARE PLAN
Principal Discharge DX:	Malignant neoplasm of upper-outer quadrant of right breast in female, estrogen receptor positive  Goal:	Drain care at home  Assessment and plan of treatment:	Patient and her fiance are capable of taking care of drains

## 2019-02-15 NOTE — DISCHARGE NOTE ADULT - PATIENT PORTAL LINK FT
You can access the [facility] Patient Portal, offered by [facility] [place], by registering with the following website: [email or web address]

## 2019-02-15 NOTE — DISCHARGE NOTE ADULT - MEDICATION SUMMARY - MEDICATIONS TO TAKE
I will START or STAY ON the medications listed below when I get home from the hospital:    Xanax 0.25 mg oral tablet  -- 1 tab(s) by mouth once a day (at bedtime), As Needed  -- Indication: For anxiety I will START or STAY ON the medications listed below when I get home from the hospital:    oxyCODONE 5 mg oral tablet  -- 1-2 tab(s) by mouth every 4 hours, As needed for pain  -- Indication: For pain    Xanax 0.25 mg oral tablet  -- 1 tab(s) by mouth once a day (at bedtime), As Needed  -- Indication: For anxiety

## 2019-02-15 NOTE — DISCHARGE NOTE ADULT - CARE PROVIDER_API CALL
Kierra Campbell)  Surgery  [address], [place] [address]  Phone: [phone]  Fax: [phone]  Follow Up Time:     Deepak Alaniz)  Plastic Surgery; Surgery  [address], [address]  [place] [address]  Phone: [phone]  Fax: [phone]  Follow Up Time:

## 2019-02-15 NOTE — DISCHARGE NOTE ADULT - HOSPITAL COURSE
Patient underwent bilateral nipple sparing mastectomies/JASMYNE flap reconstruction as well as a right completion level I/II right axillary node dissection for a diagnosis of invasive ductal carcinoma metastatic to axillary nodes .  Patient required a return to OR with Dr. Alaniz because of poor Vioptix signal of the right mastectomy flap which was concerning for poor blood flow to the flap.  No abnormalities were found at the time of surgery .  Postoperative course was otherwise uneventful.

## 2019-02-15 NOTE — PROGRESS NOTE ADULT - SUBJECTIVE AND OBJECTIVE BOX
Doing well  No issues        MEDICATIONS  (STANDING):  acetaminophen   Tablet .. 650 milliGRAM(s) Oral every 6 hours  docusate sodium 100 milliGRAM(s) Oral three times a day  enoxaparin Injectable 40 milliGRAM(s) SubCutaneous every 24 hours  ibuprofen  Tablet. 400 milliGRAM(s) Oral every 8 hours    MEDICATIONS  (PRN):  fentaNYL    Injectable 50 MICROGram(s) IV Push every 10 minutes PRN Severe Pain (7 - 10)  HYDROmorphone  Injectable 0.5 milliGRAM(s) IV Push every 10 minutes PRN Moderate Pain (4 - 6)  HYDROmorphone  Injectable 0.5 milliGRAM(s) IV Push every 4 hours PRN Severe Pain (7 - 10)  HYDROmorphone  Injectable 0.5 milliGRAM(s) IV Push every 10 minutes PRN Moderate Pain (4 - 6)  LORazepam   Injectable 0.5 milliGRAM(s) IV Push once PRN Anxiety  LORazepam   Injectable 0.5 milliGRAM(s) IV Push every 2 hours PRN Anxiety  ondansetron Injectable 4 milliGRAM(s) IV Push once PRN Nausea and/or Vomiting  oxyCODONE    IR 10 milliGRAM(s) Oral once PRN Severe Pain (7 - 10)  oxyCODONE    IR 10 milliGRAM(s) Oral once PRN Severe Pain (7 - 10)  oxyCODONE    IR 5 milliGRAM(s) Oral every 4 hours PRN Mild Pain (1 - 3)  oxyCODONE    IR 10 milliGRAM(s) Oral every 4 hours PRN Moderate Pain (4 - 6)      Vital Signs Last 24 Hrs  T(C): 36.8 (15 Feb 2019 04:16), Max: 37.5 (14 Feb 2019 20:26)  T(F): 98.3 (15 Feb 2019 04:16), Max: 99.5 (14 Feb 2019 20:26)  HR: 97 (15 Feb 2019 04:16) (93 - 118)  BP: 103/67 (15 Feb 2019 04:16) (103/64 - 127/68)  BP(mean): --  RR: 17 (15 Feb 2019 04:16) (14 - 18)  SpO2: 96% (15 Feb 2019 04:16) (95% - 99%)    I&O's Detail    13 Feb 2019 07:01  -  14 Feb 2019 07:00  --------------------------------------------------------  IN:    lactated ringers.: 1375 mL    Other: 2100 mL  Total IN: 3475 mL    OUT:    Bulb: 20 mL    Bulb: 30 mL    Bulb: 63 mL    Bulb: 15 mL    Bulb: 18 mL    Bulb: 18 mL    Indwelling Catheter - Urethral: 1235 mL  Total OUT: 1399 mL    Total NET: 2076 mL      14 Feb 2019 07:01  -  15 Feb 2019 06:17  --------------------------------------------------------  IN:    Other: 750 mL  Total IN: 750 mL    OUT:    Bulb: 18 mL    Bulb: 28 mL    Bulb: 30 mL    Bulb: 65 mL    Bulb: 45 mL    Bulb: 90 mL    Voided: 2250 mL  Total OUT: 2526 mL    Total NET: -1776 mL              Exam  Breasts: Incisions clean, dry and intact.  No collections.  No erythema.  Skin viable.    Flaps:  Soft.  Capillary refill 2-3 seconds.  Viable  Viopitx:  69L  69R    Abdomen:  Incision clean, dry and intact.   No collections.  No erythema.  Skin viable.      Assessment and Plan  ambulate  ADAT  Vioptix

## 2019-02-15 NOTE — PROGRESS NOTE ADULT - SUBJECTIVE AND OBJECTIVE BOX
SURGICAL PROGRESS NOTE    STATUS POST:   Bialtearl nipple sparing mastectomies with JASMYNE flap, right sentinel node biopsy, completion axillary dissection    POST OPERATIVE DAY #: 2    Vital Signs Last 24 Hrs  T(C): 36.9 (15 Feb 2019 08:18), Max: 37.5 (14 Feb 2019 20:26)  T(F): 98.4 (15 Feb 2019 08:18), Max: 99.5 (14 Feb 2019 20:26)  HR: 92 (15 Feb 2019 08:18) (92 - 118)  BP: 105/64 (15 Feb 2019 08:18) (103/64 - 127/68)  BP(mean): --  RR: 17 (15 Feb 2019 08:18) (14 - 18)  SpO2: 95% (15 Feb 2019 08:18) (95% - 99%)      SUBJECTIVE:     I&O's Detail    14 Feb 2019 07:01  -  15 Feb 2019 07:00  --------------------------------------------------------  IN:    Other: 750 mL  Total IN: 750 mL    OUT:    Bulb: 38 mL    Bulb: 85 mL    Bulb: 28 mL    Bulb: 60 mL    Bulb: 120 mL    Bulb: 75 mL    Voided: 3050 mL  Total OUT: 3456 mL    Total NET: -2706 mL      15 Feb 2019 07:01  -  15 Feb 2019 12:10  --------------------------------------------------------  IN:  Total IN: 0 mL    OUT:    Bulb: 15 mL    Bulb: 20 mL    Bulb: 5 mL    Bulb: 30 mL    Bulb: 5 mL    Bulb: 20 mL    Voided: 600 mL  Total OUT: 695 mL    Total NET: -695 mL          MEDICATIONS  (STANDING):  acetaminophen   Tablet .. 650 milliGRAM(s) Oral every 6 hours  docusate sodium 100 milliGRAM(s) Oral three times a day  enoxaparin Injectable 40 milliGRAM(s) SubCutaneous every 24 hours  ibuprofen  Tablet. 400 milliGRAM(s) Oral every 8 hours    MEDICATIONS  (PRN):  HYDROmorphone  Injectable 0.5 milliGRAM(s) IV Push every 4 hours PRN Severe Pain (7 - 10)  LORazepam   Injectable 0.5 milliGRAM(s) IV Push every 2 hours PRN Anxiety  oxyCODONE    IR 5 milliGRAM(s) Oral every 4 hours PRN Mild Pain (1 - 3)  oxyCODONE    IR 10 milliGRAM(s) Oral every 4 hours PRN Moderate Pain (4 - 6)      LABS:                        8.7    12.22 )-----------( 173      ( 15 Feb 2019 07:53 )             25.0     02-15    135  |  105  |  5<L>  ----------------------------<  115<H>  4.0   |  25  |  0.48<L>    Ca    8.1<L>      15 Feb 2019 07:53            RADIOLOGY & ADDITIONAL STUDIES:

## 2019-02-16 RX ORDER — OXYCODONE HYDROCHLORIDE 5 MG/1
1 TABLET ORAL
Qty: 0 | Refills: 0 | COMMUNITY
Start: 2019-02-16

## 2019-02-16 RX ADMIN — ENOXAPARIN SODIUM 40 MILLIGRAM(S): 100 INJECTION SUBCUTANEOUS at 13:46

## 2019-02-16 RX ADMIN — Medication 650 MILLIGRAM(S): at 06:05

## 2019-02-16 RX ADMIN — Medication 100 MILLIGRAM(S): at 13:46

## 2019-02-16 RX ADMIN — OXYCODONE HYDROCHLORIDE 5 MILLIGRAM(S): 5 TABLET ORAL at 16:54

## 2019-02-16 RX ADMIN — ZOLPIDEM TARTRATE 5 MILLIGRAM(S): 10 TABLET ORAL at 00:13

## 2019-02-16 RX ADMIN — Medication 100 MILLIGRAM(S): at 22:15

## 2019-02-16 RX ADMIN — Medication 650 MILLIGRAM(S): at 18:44

## 2019-02-16 RX ADMIN — Medication 400 MILLIGRAM(S): at 06:05

## 2019-02-16 RX ADMIN — OXYCODONE HYDROCHLORIDE 5 MILLIGRAM(S): 5 TABLET ORAL at 15:21

## 2019-02-16 RX ADMIN — Medication 400 MILLIGRAM(S): at 15:19

## 2019-02-16 RX ADMIN — Medication 400 MILLIGRAM(S): at 22:15

## 2019-02-16 RX ADMIN — Medication 100 MILLIGRAM(S): at 05:53

## 2019-02-16 RX ADMIN — Medication 650 MILLIGRAM(S): at 05:52

## 2019-02-16 RX ADMIN — Medication 400 MILLIGRAM(S): at 23:31

## 2019-02-16 RX ADMIN — Medication 400 MILLIGRAM(S): at 05:53

## 2019-02-16 RX ADMIN — OXYCODONE HYDROCHLORIDE 5 MILLIGRAM(S): 5 TABLET ORAL at 22:16

## 2019-02-16 RX ADMIN — Medication 650 MILLIGRAM(S): at 12:10

## 2019-02-16 RX ADMIN — Medication 650 MILLIGRAM(S): at 00:14

## 2019-02-16 RX ADMIN — Medication 650 MILLIGRAM(S): at 13:48

## 2019-02-16 RX ADMIN — OXYCODONE HYDROCHLORIDE 5 MILLIGRAM(S): 5 TABLET ORAL at 23:31

## 2019-02-16 RX ADMIN — Medication 400 MILLIGRAM(S): at 13:46

## 2019-02-16 RX ADMIN — ZOLPIDEM TARTRATE 5 MILLIGRAM(S): 10 TABLET ORAL at 22:15

## 2019-02-16 RX ADMIN — Medication 650 MILLIGRAM(S): at 20:08

## 2019-02-16 NOTE — PROGRESS NOTE ADULT - SUBJECTIVE AND OBJECTIVE BOX
Doing well  No issues  Pain controlled      MEDICATIONS  (STANDING):  acetaminophen   Tablet .. 650 milliGRAM(s) Oral every 6 hours  docusate sodium 100 milliGRAM(s) Oral three times a day  enoxaparin Injectable 40 milliGRAM(s) SubCutaneous every 24 hours  ibuprofen  Tablet. 400 milliGRAM(s) Oral every 8 hours    MEDICATIONS  (PRN):  HYDROmorphone  Injectable 0.5 milliGRAM(s) IV Push every 4 hours PRN Severe Pain (7 - 10)  LORazepam   Injectable 0.5 milliGRAM(s) IV Push every 2 hours PRN Anxiety  oxyCODONE    IR 5 milliGRAM(s) Oral every 4 hours PRN Mild Pain (1 - 3)  oxyCODONE    IR 10 milliGRAM(s) Oral every 4 hours PRN Moderate Pain (4 - 6)  zolpidem 5 milliGRAM(s) Oral at bedtime PRN Insomnia      Vital Signs Last 24 Hrs  T(C): 36.5 (16 Feb 2019 05:59), Max: 37.3 (15 Feb 2019 20:09)  T(F): 97.7 (16 Feb 2019 05:59), Max: 99.1 (15 Feb 2019 20:09)  HR: 80 (16 Feb 2019 05:59) (80 - 108)  BP: 103/65 (16 Feb 2019 05:59) (101/58 - 126/72)  BP(mean): --  RR: 16 (16 Feb 2019 05:59) (16 - 17)  SpO2: 99% (16 Feb 2019 05:59) (95% - 99%)    I&O's Detail    15 Feb 2019 07:01  -  16 Feb 2019 07:00  --------------------------------------------------------  IN:  Total IN: 0 mL    OUT:    Bulb: 110 mL    Bulb: 50 mL    Bulb: 45 mL    Bulb: 65 mL    Bulb: 45 mL    Bulb: 58 mL    Voided: 1600 mL  Total OUT: 1973 mL    Total NET: -1973 mL              Exam  Breasts: Incisions clean, dry and intact.  No collections.  No erythema.  Skin viable.    Flaps:  Soft.  Capillary refill 2-3 seconds.  Viable    Abdomen:  Incision clean, dry and intact.   No collections.  No erythema.  Skin viable.      Assessment and Plan  DC home later today or tomorrow  Follow up Tuesday  Drain care teaching

## 2019-02-16 NOTE — PROGRESS NOTE ADULT - ASSESSMENT
48 year old female with invasive ductal carcinoma of the right breast metastatic to axillary nodes.     Patient with mild to moderate postop pain.  Has been OOB ambulating.  Tolerating regular diet.  Probable discharge tomorrow.
48 year old female s/p bilateral mastectomies for right breast carcinoma with axillary metastases.  Patient is ambulating and tolerating a regular diet.  Anticipated discharge today/tomorrow.  Discharge as per Plastics.
48 year old female with invasive ductal carcinoma of the right breast.  Both the sentinel node and nonsentinel node (1) were positive for metastases on intraoperative pathology consultation.  Therefore, a level I/II completion axillary dissection and LYMPHA procedure were performed.  Patient doing well on POD # 1. She has moderate postop pain.  Analgesia as needed.  OOB to chair today.

## 2019-02-16 NOTE — PROGRESS NOTE ADULT - SUBJECTIVE AND OBJECTIVE BOX
SURGICAL PROGRESS NOTE    STATUS POST:  Bilateral Nipple sparing mastectomies with JASMYNE flap    POST OPERATIVE DAY #: 3    Vital Signs Last 24 Hrs  T(C): 36.5 (16 Feb 2019 05:59), Max: 37.3 (15 Feb 2019 20:09)  T(F): 97.7 (16 Feb 2019 05:59), Max: 99.1 (15 Feb 2019 20:09)  HR: 80 (16 Feb 2019 05:59) (80 - 108)  BP: 103/65 (16 Feb 2019 05:59) (101/58 - 126/72)  BP(mean): --  RR: 16 (16 Feb 2019 05:59) (16 - 17)  SpO2: 99% (16 Feb 2019 05:59) (95% - 99%)      SUBJECTIVE:     I&O's Detail    15 Feb 2019 07:01  -  16 Feb 2019 07:00  --------------------------------------------------------  IN:  Total IN: 0 mL    OUT:    Bulb: 110 mL    Bulb: 50 mL    Bulb: 45 mL    Bulb: 65 mL    Bulb: 45 mL    Bulb: 58 mL    Voided: 1600 mL  Total OUT: 1973 mL    Total NET: -1973 mL          MEDICATIONS  (STANDING):  acetaminophen   Tablet .. 650 milliGRAM(s) Oral every 6 hours  docusate sodium 100 milliGRAM(s) Oral three times a day  enoxaparin Injectable 40 milliGRAM(s) SubCutaneous every 24 hours  ibuprofen  Tablet. 400 milliGRAM(s) Oral every 8 hours    MEDICATIONS  (PRN):  HYDROmorphone  Injectable 0.5 milliGRAM(s) IV Push every 4 hours PRN Severe Pain (7 - 10)  LORazepam   Injectable 0.5 milliGRAM(s) IV Push every 2 hours PRN Anxiety  oxyCODONE    IR 5 milliGRAM(s) Oral every 4 hours PRN Mild Pain (1 - 3)  oxyCODONE    IR 10 milliGRAM(s) Oral every 4 hours PRN Moderate Pain (4 - 6)  zolpidem 5 milliGRAM(s) Oral at bedtime PRN Insomnia      LABS:                        8.7    12.22 )-----------( 173      ( 15 Feb 2019 07:53 )             25.0     02-15    135  |  105  |  5<L>  ----------------------------<  115<H>  4.0   |  25  |  0.48<L>    Ca    8.1<L>      15 Feb 2019 07:53            RADIOLOGY & ADDITIONAL STUDIES:

## 2019-02-17 VITALS
HEART RATE: 81 BPM | OXYGEN SATURATION: 100 % | TEMPERATURE: 98 F | DIASTOLIC BLOOD PRESSURE: 76 MMHG | RESPIRATION RATE: 18 BRPM | SYSTOLIC BLOOD PRESSURE: 130 MMHG

## 2019-02-17 RX ADMIN — Medication 650 MILLIGRAM(S): at 03:29

## 2019-02-17 RX ADMIN — Medication 650 MILLIGRAM(S): at 08:25

## 2019-02-17 RX ADMIN — Medication 650 MILLIGRAM(S): at 04:50

## 2019-02-17 RX ADMIN — OXYCODONE HYDROCHLORIDE 5 MILLIGRAM(S): 5 TABLET ORAL at 09:36

## 2019-02-17 RX ADMIN — Medication 400 MILLIGRAM(S): at 06:41

## 2019-02-17 RX ADMIN — Medication 100 MILLIGRAM(S): at 06:41

## 2019-02-17 NOTE — PROGRESS NOTE ADULT - SUBJECTIVE AND OBJECTIVE BOX
no c/o    b/l flaps well perfused with good cap refill  abd c/d/i  JPs serosang    Doing well  d/c home

## 2019-02-19 LAB — SURGICAL PATHOLOGY FINAL REPORT - CH: SIGNIFICANT CHANGE UP

## 2019-02-20 NOTE — PHYSICAL EXAM
[Normocephalic] : normocephalic [Atraumatic] : atraumatic [Sclera nonicteric] : sclera nonicteric [Conjunctiva pink] : conjunctiva pink [Supple] : supple [No Supraclavicular Adenopathy] : no supraclavicular adenopathy [No Cervical Adenopathy] : no cervical adenopathy [No Thyromegaly] : no thyromegaly [Normal Sinus Rhythm] : normal sinus rhythm [No Gallops] : no gallops [No Rubs] : no pericardial rub [Examined in the supine and seated position] : examined in the supine and seated position [Symmetrical] : symmetrical [Bra Size: ___] : Bra Size: [unfilled] [Grade 3] : Ptosis Grade 3 [No dominant masses] : no dominant masses in right breast  [No dominant masses] : no dominant masses left breast [No Nipple Retraction] : no left nipple retraction [No Nipple Discharge] : no left nipple discharge [No Axillary Lymphadenopathy] : no left axillary lymphadenopathy [Soft] : abdomen soft [No Hepato-Splenomegaly] : no hepato-splenomegaly [No Edema] : no edema [No Rashes] : no rashes [No Ulceration] : no ulceration [de-identified] : Bilateral reduction mammoplasty scars

## 2019-02-20 NOTE — DATA REVIEWED
[FreeTextEntry1] : Mammogram: ( [facility])  11/05/18   Findings:  Extensive post reduction scarring bilaterally.  Increasing architectural distortion in the right breast at 11 and 12:00. Grouped punctate calcifications at 11:00  right breast.  Oval asymmetry Left breast centrally seen only on the MLO view.\par \par Gene. Diagnostic Mammogram:  11/30/18   Findings:  Heterogenous calcifications right breast 11:00 which are suspicious for malignancy.  The oval asymmetry of the left breast resolves with compression imaging.  \par  \par Breast ultrasound:  11/30/18  Findings:   Bilateral architectural distortion.  No discrete masses. \par \par Right stereotactic biopsy ( 11:00 at [facility]):  1/3/19   PATHOLOGY: Moderately differentiated infiltrating ductal carcinoma., DCIS cribriform and micropapillary patterns.  ER-.95%, DE - 95%, HER2 - negative.\par \par MRI:  1/1019  Findings:  Irregular enhancement of the outer central right breast spanning up to 5.5 cm in AP dimension and 2.2 cm in the transverse dimension c/w areas of architectural distortion and calcifications on mammography.   N suspicious findings in the left breast.  No adenopathy.  9 mm T2 bright lesion in the liver.

## 2019-02-20 NOTE — HISTORY OF PRESENT ILLNESS
[FreeTextEntry1] : 48 year old female diagnosed with an invasive ductal carcinoma of the right breast measuring 5 cm on MRI is being admitted to [facility] for bilateral mastectomies with JASMYNE flap reconstruction, right sentinel node biopsy and possible axillary dissection on 2/13/19.

## 2019-02-21 ENCOUNTER — APPOINTMENT (OUTPATIENT)
Dept: BREAST CENTER | Facility: CLINIC | Age: 49
End: 2019-02-21
Payer: COMMERCIAL

## 2019-02-21 PROCEDURE — 99024 POSTOP FOLLOW-UP VISIT: CPT

## 2019-02-22 ENCOUNTER — FORM ENCOUNTER (OUTPATIENT)
Age: 49
End: 2019-02-22

## 2019-02-22 DIAGNOSIS — Z17.1 ESTROGEN RECEPTOR NEGATIVE STATUS [ER-]: ICD-10-CM

## 2019-02-22 DIAGNOSIS — C50.411 MALIGNANT NEOPLASM OF UPPER-OUTER QUADRANT OF RIGHT FEMALE BREAST: ICD-10-CM

## 2019-02-22 DIAGNOSIS — Z40.01 ENCOUNTER FOR PROPHYLACTIC REMOVAL OF BREAST: ICD-10-CM

## 2019-02-22 DIAGNOSIS — C77.3 SECONDARY AND UNSPECIFIED MALIGNANT NEOPLASM OF AXILLA AND UPPER LIMB LYMPH NODES: ICD-10-CM

## 2019-02-22 DIAGNOSIS — K42.9 UMBILICAL HERNIA WITHOUT OBSTRUCTION OR GANGRENE: ICD-10-CM

## 2019-02-22 DIAGNOSIS — Z87.891 PERSONAL HISTORY OF NICOTINE DEPENDENCE: ICD-10-CM

## 2019-02-22 PROBLEM — Z30.432 ENCOUNTER FOR IUD REMOVAL: Status: RESOLVED | Noted: 2019-01-14 | Resolved: 2019-02-22

## 2019-02-22 PROBLEM — N64.89 BREAST ASYMMETRY: Status: RESOLVED | Noted: 2018-11-10 | Resolved: 2019-02-22

## 2019-02-22 RX ORDER — ALPRAZOLAM 0.25 MG/1
0.25 TABLET ORAL
Qty: 12 | Refills: 0 | Status: DISCONTINUED | COMMUNITY
Start: 2019-01-09 | End: 2019-02-22

## 2019-02-22 RX ORDER — ALPRAZOLAM 0.25 MG/1
0.25 TABLET ORAL
Qty: 20 | Refills: 0 | Status: DISCONTINUED | COMMUNITY
Start: 2019-01-16 | End: 2019-02-22

## 2019-02-22 NOTE — DATA REVIEWED
[FreeTextEntry1] : Mammogram: ( [facility])  11/05/18   Findings:  Extensive post reduction scarring bilaterally.  Increasing architectural distortion in the right breast at 11 and 12:00. Grouped punctate calcifications at 11:00  right breast.  Oval asymmetry Left breast centrally seen only on the MLO view.\par \par Gene. Diagnostic Mammogram:  11/30/18   Findings:  Heterogenous calcifications right breast 11:00 which are suspicious for malignancy.  The oval asymmetry of the left breast resolves with compression imaging.  \par  \par Breast ultrasound:  11/30/18  Findings:   Bilateral architectural distortion.  No discrete masses. \par \par Right stereotactic biopsy ( 11:00 at [facility]):  1/3/19   PATHOLOGY: Moderately differentiated infiltrating ductal carcinoma., DCIS cribriform and micropapillary patterns.  ER-.95%, CT - 95%, HER2 - negative.\par \par MRI:  1/1019  Findings:  Irregular enhancement of the outer central right breast spanning up to 5.5 cm in AP dimension and 2.2 cm in the transverse dimension c/w areas of architectural distortion and calcifications on mammography.   N suspicious findings in the left breast.  No adenopathy.  9 mm T2 bright lesion in the liver.\par \par SURGICAL PATHOLOGY: 2/13/19\par Left mastectomy - no suspicious findings.\par Left retronipple tissue - negative.\par Right mastectomy - 5 mm invasive ductal carcinoma.  Extensive DCIS.\par R sentinel node  # 1 -  1 cm metastasis . Extracapsular  extension present. \par Nonsentinel node # 1 -  metastatic carcinoma with extracapsular extension. \par Right retronipple tissue - negative. Extracapsular extension present.\par Right axillary contents - Metastatic carcinoma to 8 of 11 nodes.  \par Right Internal mammary lymph node - negative.\par [id number]  Clinical stage IIIB

## 2019-02-22 NOTE — HISTORY OF PRESENT ILLNESS
[FreeTextEntry1] : 48 year old female is her with her boyfriend for a postop visit.  She underwent bilateral nipple sparing mastectomies with JASMYNE flaps, right axillary sentinel node biopsy and completion axillary node dissection on 2/13/19.

## 2019-02-22 NOTE — CONSULT LETTER
[Dear  ___] : Dear ~PIERRE, [Please see my note below.] : Please see my note below. [Sincerely,] : Sincerely, [DrEileen  ___] : Dr. GREGORY [FreeTextEntry2] : Shwetha Hyde MD [FreeTextEntry3] : Kierra Campbell MD FACS\par

## 2019-02-22 NOTE — PHYSICAL EXAM
[Examined in the supine and seated position] : examined in the supine and seated position [Symmetrical] : symmetrical [Grade 3] : Ptosis Grade 3 [Soft] : abdomen soft [de-identified] : Mastectomy incision inframmary fold healing well.  JASMYNE flap warm, viable.  Ecchymosis of nipple and areola with good capillary refill.  [de-identified] : S/P prophylactic mastectomy with JASMYNE flap.  Skin flaps and nipple areola complex are viable.  [de-identified] : Pelvic incision clean and dry.

## 2019-02-23 ENCOUNTER — APPOINTMENT (OUTPATIENT)
Dept: CT IMAGING | Facility: CLINIC | Age: 49
End: 2019-02-23
Payer: COMMERCIAL

## 2019-02-23 ENCOUNTER — OUTPATIENT (OUTPATIENT)
Dept: OUTPATIENT SERVICES | Facility: HOSPITAL | Age: 49
LOS: 1 days | End: 2019-02-23
Payer: COMMERCIAL

## 2019-02-23 DIAGNOSIS — C50.411 MALIGNANT NEOPLASM OF UPPER-OUTER QUADRANT OF RIGHT FEMALE BREAST: ICD-10-CM

## 2019-02-23 DIAGNOSIS — Z98.890 OTHER SPECIFIED POSTPROCEDURAL STATES: Chronic | ICD-10-CM

## 2019-02-23 PROBLEM — C50.919 MALIGNANT NEOPLASM OF UNSPECIFIED SITE OF UNSPECIFIED FEMALE BREAST: Chronic | Status: ACTIVE | Noted: 2019-02-06

## 2019-02-23 PROBLEM — E66.9 OBESITY, UNSPECIFIED: Chronic | Status: ACTIVE | Noted: 2019-02-06

## 2019-02-23 PROCEDURE — 71260 CT THORAX DX C+: CPT

## 2019-02-23 PROCEDURE — 71260 CT THORAX DX C+: CPT | Mod: 26

## 2019-02-24 ENCOUNTER — FORM ENCOUNTER (OUTPATIENT)
Age: 49
End: 2019-02-24

## 2019-02-25 ENCOUNTER — APPOINTMENT (OUTPATIENT)
Dept: NUCLEAR MEDICINE | Facility: CLINIC | Age: 49
End: 2019-02-25
Payer: COMMERCIAL

## 2019-02-25 ENCOUNTER — OTHER (OUTPATIENT)
Age: 49
End: 2019-02-25

## 2019-02-25 ENCOUNTER — OUTPATIENT (OUTPATIENT)
Dept: OUTPATIENT SERVICES | Facility: HOSPITAL | Age: 49
LOS: 1 days | End: 2019-02-25

## 2019-02-25 DIAGNOSIS — C50.411 MALIGNANT NEOPLASM OF UPPER-OUTER QUADRANT OF RIGHT FEMALE BREAST: ICD-10-CM

## 2019-02-25 DIAGNOSIS — Z98.890 OTHER SPECIFIED POSTPROCEDURAL STATES: Chronic | ICD-10-CM

## 2019-02-25 PROCEDURE — 78306 BONE IMAGING WHOLE BODY: CPT | Mod: 26

## 2019-02-26 ENCOUNTER — APPOINTMENT (OUTPATIENT)
Dept: HEMATOLOGY ONCOLOGY | Facility: CLINIC | Age: 49
End: 2019-02-26
Payer: COMMERCIAL

## 2019-02-26 VITALS
HEIGHT: 66 IN | WEIGHT: 215 LBS | HEART RATE: 89 BPM | SYSTOLIC BLOOD PRESSURE: 122 MMHG | BODY MASS INDEX: 34.55 KG/M2 | TEMPERATURE: 98.7 F | DIASTOLIC BLOOD PRESSURE: 78 MMHG

## 2019-02-26 DIAGNOSIS — N64.89 OTHER SPECIFIED DISORDERS OF BREAST: ICD-10-CM

## 2019-02-26 DIAGNOSIS — Z30.432 ENCOUNTER FOR REMOVAL OF INTRAUTERINE CONTRACEPTIVE DEVICE: ICD-10-CM

## 2019-02-26 PROCEDURE — 99244 OFF/OP CNSLTJ NEW/EST MOD 40: CPT

## 2019-02-26 NOTE — REASON FOR VISIT
[Initial Consultation] : an initial consultation [FreeTextEntry2] : adjuvant therapy for breast cancer

## 2019-02-26 NOTE — ASSESSMENT
[FreeTextEntry1] : 49 y/o perimenopausal with newly diagnosed invasive ductal cancer of right breast T1a, N3 stage III C disease strongly ER/RI positive and HER 2 negative ( on biopsy). \par S/p bilateral mastectomies , right  axillary  dissection with LYMPHa procedure and JASMYNE reconstruction.\par \par Unusual presentation- very small primary  tumor without high grade histology, favorable receptor status and extensive  fernanda involvement.\par \par Will ask pathology to repeat HER 2 on tumor tissue from definitive surgery.\par \par Long discussion re: diagnosis, prognosis, explained risk of systemic recurrence and rationale for systemic adjuvant therapy.\par Discussed adjuvant chemotherapy and hormonal therapy.\par Discussed chemotherapy protocol: dose dense ACT- side effects, schedules, monitoring.\par \par PAtient agrees to adjuvant chemo and hormonal therapy.  \par She will need echocardiogram and Mediport.\par Will discuss with plastic surgery- when OK to start chemotherapy ( might need debridement for necrotic nipple first)- preferably in first 4-6 weeks after surgery.\par \par Will obtain baseline estradiol, FSH,LH - to evaluate if postmenopausal prior to chemotx. \par She also will need adjuvant radiation. \par Answered questions re: post treatment monitoring in the future.\par \par Return visit for chemotherapy talk with NP next week.

## 2019-02-26 NOTE — HISTORY OF PRESENT ILLNESS
[Disease: _____________________] : Disease: [unfilled] [T: ___] : T[unfilled] [N: ___] : N[unfilled] [AJCC Stage: ____] : AJCC Stage: [unfilled] [de-identified] : 47 y/o perimenopausal woman presented with an abnormality on a screening mammogram. Biopsy confirmed cancer. Breast MRI showed 5.5 area on enhancement around biopsy site. Patient elected to have bilateral mastectomies. Genetic testing Color was negative.\par On 2/13/19 she underwent nipple sparing mastectomies, sentinel LN biopsy was positive- followed by completion of axillary dissection and LYMPHA procedure and JASMYNE flap reconstruction.\par Pathology revealed 5 mm invasive ductal cancer mod differentiated , strongly ER/UT positive and HER 2 negative , metastatic to 10/14 axillary LNs ( largest metastasis 1 cm  with extranodal extension). \par \par CT chest, abdomen and pelvis and bone scan - negative for metastatic disease ( PET not covered by insurance).\par \par Recovering from surgery. Abdominal drains to be removed next week. Has small area on necrosis in central part of right nipple- monitored, might need debridement. \par \par GYN Hx Menarche 13  G3 No menses since she had  IUD placed 8 yrs ago. [de-identified] : moderately differentiated invasive ductal cancer  SBR 6/9  [de-identified] : ER 95 %  GA 95 %  HER 2 + 1 neg ( biopsy)  [de-identified] : Genetic testing Color negative

## 2019-02-26 NOTE — CONSULT LETTER
[Dear  ___] : Dear ~PIERRE, [( Thank you for referring [unfilled] for consultation for _____ )] : Thank you for referring [unfilled] for consultation for [unfilled] [Please see my note below.] : Please see my note below. [Consult Closing:] : Thank you very much for allowing me to participate in the care of this patient.  If you have any questions, please do not hesitate to contact me. [Sincerely,] : Sincerely, [FreeTextEntry2] : Dr Kierra Vergarakit  [FreeTextEntry3] : Zoie Weinstein

## 2019-02-26 NOTE — PHYSICAL EXAM
[Obese] : obese [Normal] : affect appropriate [de-identified] : s/p bilateral mastectomies nipple sparing with JASMYNE reconstruction- and Rt axillary LND. Small central black necrotic area in right nipple [de-identified] : healing surgical incision with COOKIE drains in place

## 2019-03-02 ENCOUNTER — TRANSCRIPTION ENCOUNTER (OUTPATIENT)
Age: 49
End: 2019-03-02

## 2019-03-05 ENCOUNTER — FORM ENCOUNTER (OUTPATIENT)
Age: 49
End: 2019-03-05

## 2019-03-06 ENCOUNTER — OUTPATIENT (OUTPATIENT)
Dept: OUTPATIENT SERVICES | Facility: HOSPITAL | Age: 49
LOS: 1 days | Discharge: HOME | End: 2019-03-06
Payer: COMMERCIAL

## 2019-03-06 VITALS
OXYGEN SATURATION: 98 % | WEIGHT: 215.39 LBS | HEIGHT: 66 IN | RESPIRATION RATE: 16 BRPM | DIASTOLIC BLOOD PRESSURE: 86 MMHG | TEMPERATURE: 98 F | HEART RATE: 86 BPM | SYSTOLIC BLOOD PRESSURE: 127 MMHG

## 2019-03-06 VITALS
SYSTOLIC BLOOD PRESSURE: 120 MMHG | HEART RATE: 90 BPM | TEMPERATURE: 98 F | OXYGEN SATURATION: 100 % | RESPIRATION RATE: 16 BRPM | DIASTOLIC BLOOD PRESSURE: 71 MMHG

## 2019-03-06 DIAGNOSIS — Z98.890 OTHER SPECIFIED POSTPROCEDURAL STATES: Chronic | ICD-10-CM

## 2019-03-06 DIAGNOSIS — Z90.13 ACQUIRED ABSENCE OF BILATERAL BREASTS AND NIPPLES: Chronic | ICD-10-CM

## 2019-03-06 LAB — HCG UR QL: NEGATIVE — SIGNIFICANT CHANGE UP

## 2019-03-06 PROCEDURE — 36561 INSERT TUNNELED CV CATH: CPT

## 2019-03-06 PROCEDURE — 76937 US GUIDE VASCULAR ACCESS: CPT | Mod: 26

## 2019-03-06 PROCEDURE — 77001 FLUOROGUIDE FOR VEIN DEVICE: CPT | Mod: 26

## 2019-03-06 RX ORDER — SODIUM CHLORIDE 9 MG/ML
1000 INJECTION, SOLUTION INTRAVENOUS
Qty: 0 | Refills: 0 | Status: DISCONTINUED | OUTPATIENT
Start: 2019-03-06 | End: 2019-03-06

## 2019-03-06 RX ORDER — OXYCODONE HYDROCHLORIDE 5 MG/1
10 TABLET ORAL ONCE
Qty: 0 | Refills: 0 | Status: DISCONTINUED | OUTPATIENT
Start: 2019-03-06 | End: 2019-03-06

## 2019-03-06 RX ORDER — ONDANSETRON 8 MG/1
4 TABLET, FILM COATED ORAL ONCE
Qty: 0 | Refills: 0 | Status: DISCONTINUED | OUTPATIENT
Start: 2019-03-06 | End: 2019-03-06

## 2019-03-06 RX ORDER — ACETAMINOPHEN 500 MG
1000 TABLET ORAL ONCE
Qty: 0 | Refills: 0 | Status: COMPLETED | OUTPATIENT
Start: 2019-03-06 | End: 2019-03-06

## 2019-03-06 RX ORDER — FENTANYL CITRATE 50 UG/ML
50 INJECTION INTRAVENOUS
Qty: 0 | Refills: 0 | Status: DISCONTINUED | OUTPATIENT
Start: 2019-03-06 | End: 2019-03-06

## 2019-03-06 RX ORDER — OXYCODONE HYDROCHLORIDE 5 MG/1
5 TABLET ORAL ONCE
Qty: 0 | Refills: 0 | Status: DISCONTINUED | OUTPATIENT
Start: 2019-03-06 | End: 2019-03-06

## 2019-03-06 RX ADMIN — FENTANYL CITRATE 50 MICROGRAM(S): 50 INJECTION INTRAVENOUS at 11:48

## 2019-03-06 RX ADMIN — FENTANYL CITRATE 50 MICROGRAM(S): 50 INJECTION INTRAVENOUS at 12:24

## 2019-03-06 RX ADMIN — OXYCODONE HYDROCHLORIDE 10 MILLIGRAM(S): 5 TABLET ORAL at 12:24

## 2019-03-06 RX ADMIN — FENTANYL CITRATE 50 MICROGRAM(S): 50 INJECTION INTRAVENOUS at 12:03

## 2019-03-06 RX ADMIN — FENTANYL CITRATE 50 MICROGRAM(S): 50 INJECTION INTRAVENOUS at 12:55

## 2019-03-06 RX ADMIN — Medication 400 MILLIGRAM(S): at 12:19

## 2019-03-06 RX ADMIN — Medication 1000 MILLIGRAM(S): at 12:40

## 2019-03-06 RX ADMIN — OXYCODONE HYDROCHLORIDE 10 MILLIGRAM(S): 5 TABLET ORAL at 11:48

## 2019-03-06 NOTE — BRIEF OPERATIVE NOTE - PROCEDURE
<<-----Click on this checkbox to enter Procedure Imaging guidance, for Port-A-Cath insertion  03/06/2019    Active  CAROLYNN

## 2019-03-06 NOTE — BRIEF OPERATIVE NOTE - OPERATION/FINDINGS
1) LIJV patent  2) Junction of left brachiocephalic vein and SVC tortuous, with tendancy for wire to reflux into R brachiocephalic vein upon advancement  3) 8F SL Port placed via LIJV, tip in RA, 1cm extra length added to prevent reflux of catheter tip superiorly

## 2019-03-06 NOTE — CHART NOTE - NSCHARTNOTEFT_GEN_A_CORE
Vascular & Interventional Radiology Pre-Procedure Note    Procedure Name: Port    HPI: 48y Female with R breast CA. Plan to initiate chemotherapy    Allergies: latex (Rash)    Medications (Abx/Cardiac/Anticoagulation/Blood Products)      Data:  167.64  97.7  T(C): 36.5  HR: 86  BP: 127/86  RR: 16  SpO2: 98%    Exam  General: NAD        Imaging: NA    Plan:   -48y Female presents for L Port  -Risks/Benefits/alternatives explained with the patient and/or healthcare proxy and witnessed informed consent obtained.

## 2019-03-06 NOTE — PROVIDER CONTACT NOTE (OTHER) - ACTION/TREATMENT ORDERED:
call PA to try to maintain suctionto L mamie if not removed MAMIE place bacitracin and cover with gauze and tape

## 2019-03-07 ENCOUNTER — RESULT REVIEW (OUTPATIENT)
Age: 49
End: 2019-03-07

## 2019-03-07 ENCOUNTER — OUTPATIENT (OUTPATIENT)
Dept: OUTPATIENT SERVICES | Facility: HOSPITAL | Age: 49
LOS: 1 days | Discharge: HOME | End: 2019-03-07
Payer: COMMERCIAL

## 2019-03-07 VITALS
SYSTOLIC BLOOD PRESSURE: 124 MMHG | HEIGHT: 66 IN | OXYGEN SATURATION: 97 % | WEIGHT: 190.04 LBS | TEMPERATURE: 99 F | HEART RATE: 88 BPM | DIASTOLIC BLOOD PRESSURE: 78 MMHG | RESPIRATION RATE: 15 BRPM

## 2019-03-07 VITALS
RESPIRATION RATE: 16 BRPM | DIASTOLIC BLOOD PRESSURE: 88 MMHG | TEMPERATURE: 98 F | HEART RATE: 88 BPM | OXYGEN SATURATION: 100 % | SYSTOLIC BLOOD PRESSURE: 142 MMHG

## 2019-03-07 DIAGNOSIS — Z98.890 OTHER SPECIFIED POSTPROCEDURAL STATES: Chronic | ICD-10-CM

## 2019-03-07 DIAGNOSIS — Z90.13 ACQUIRED ABSENCE OF BILATERAL BREASTS AND NIPPLES: Chronic | ICD-10-CM

## 2019-03-07 LAB — HCG UR QL: NEGATIVE — SIGNIFICANT CHANGE UP

## 2019-03-07 PROCEDURE — 88305 TISSUE EXAM BY PATHOLOGIST: CPT | Mod: 26

## 2019-03-07 RX ORDER — OXYCODONE HYDROCHLORIDE 5 MG/1
5 TABLET ORAL EVERY 6 HOURS
Qty: 0 | Refills: 0 | Status: DISCONTINUED | OUTPATIENT
Start: 2019-03-07 | End: 2019-03-07

## 2019-03-07 RX ORDER — ALPRAZOLAM 0.25 MG
1 TABLET ORAL
Qty: 0 | Refills: 0 | COMMUNITY

## 2019-03-07 RX ORDER — ONDANSETRON 8 MG/1
4 TABLET, FILM COATED ORAL ONCE
Qty: 0 | Refills: 0 | Status: DISCONTINUED | OUTPATIENT
Start: 2019-03-07 | End: 2019-03-07

## 2019-03-07 RX ORDER — ONDANSETRON 8 MG/1
4 TABLET, FILM COATED ORAL EVERY 6 HOURS
Qty: 0 | Refills: 0 | Status: DISCONTINUED | OUTPATIENT
Start: 2019-03-07 | End: 2019-03-22

## 2019-03-07 RX ORDER — ACETAMINOPHEN 500 MG
0 TABLET ORAL
Qty: 0 | Refills: 0 | COMMUNITY

## 2019-03-07 RX ORDER — SODIUM CHLORIDE 9 MG/ML
1000 INJECTION, SOLUTION INTRAVENOUS
Qty: 0 | Refills: 0 | Status: DISCONTINUED | OUTPATIENT
Start: 2019-03-07 | End: 2019-03-07

## 2019-03-07 RX ORDER — OXYCODONE HYDROCHLORIDE 5 MG/1
10 TABLET ORAL ONCE
Qty: 0 | Refills: 0 | Status: DISCONTINUED | OUTPATIENT
Start: 2019-03-07 | End: 2019-03-07

## 2019-03-07 RX ORDER — CYCLOBENZAPRINE HYDROCHLORIDE 10 MG/1
0 TABLET, FILM COATED ORAL
Qty: 0 | Refills: 0 | COMMUNITY

## 2019-03-07 RX ORDER — OXYCODONE HYDROCHLORIDE 5 MG/1
5 TABLET ORAL ONCE
Qty: 0 | Refills: 0 | Status: DISCONTINUED | OUTPATIENT
Start: 2019-03-07 | End: 2019-03-07

## 2019-03-07 RX ORDER — FENTANYL CITRATE 50 UG/ML
50 INJECTION INTRAVENOUS
Qty: 0 | Refills: 0 | Status: DISCONTINUED | OUTPATIENT
Start: 2019-03-07 | End: 2019-03-07

## 2019-03-07 RX ORDER — HYDROMORPHONE HYDROCHLORIDE 2 MG/ML
1 INJECTION INTRAMUSCULAR; INTRAVENOUS; SUBCUTANEOUS EVERY 4 HOURS
Qty: 0 | Refills: 0 | Status: DISCONTINUED | OUTPATIENT
Start: 2019-03-07 | End: 2019-03-07

## 2019-03-07 RX ADMIN — FENTANYL CITRATE 50 MICROGRAM(S): 50 INJECTION INTRAVENOUS at 15:15

## 2019-03-07 RX ADMIN — FENTANYL CITRATE 50 MICROGRAM(S): 50 INJECTION INTRAVENOUS at 14:44

## 2019-03-07 RX ADMIN — FENTANYL CITRATE 50 MICROGRAM(S): 50 INJECTION INTRAVENOUS at 15:29

## 2019-03-07 RX ADMIN — OXYCODONE HYDROCHLORIDE 10 MILLIGRAM(S): 5 TABLET ORAL at 15:30

## 2019-03-07 RX ADMIN — FENTANYL CITRATE 50 MICROGRAM(S): 50 INJECTION INTRAVENOUS at 15:00

## 2019-03-07 RX ADMIN — FENTANYL CITRATE 50 MICROGRAM(S): 50 INJECTION INTRAVENOUS at 14:58

## 2019-03-07 RX ADMIN — FENTANYL CITRATE 50 MICROGRAM(S): 50 INJECTION INTRAVENOUS at 17:03

## 2019-03-07 RX ADMIN — OXYCODONE HYDROCHLORIDE 10 MILLIGRAM(S): 5 TABLET ORAL at 14:43

## 2019-03-07 RX ADMIN — FENTANYL CITRATE 50 MICROGRAM(S): 50 INJECTION INTRAVENOUS at 15:49

## 2019-03-07 NOTE — ASU DISCHARGE PLAN (ADULT/PEDIATRIC). - MEDICATION SUMMARY - MEDICATIONS TO TAKE
I will START or STAY ON the medications listed below when I get home from the hospital:    oxyCODONE 5 mg oral tablet  -- 1-2 tab(s) by mouth every 4 hours, As needed for pain  -- Indication: For pain    Tylenol 325 mg oral capsule  -- Indication: For pain    Xanax 0.25 mg oral tablet  -- 1 tab(s) by mouth once a day (at bedtime), As Needed  -- Indication: For sedative    Flexeril 5 mg oral tablet  -- Indication: For muscle spasm

## 2019-03-07 NOTE — BRIEF OPERATIVE NOTE - PROCEDURE
<<-----Click on this checkbox to enter Procedure Revision of reconstructed breast  03/07/2019    Active  DLIGHT

## 2019-03-07 NOTE — ASU DISCHARGE PLAN (ADULT/PEDIATRIC). - NOTIFY
Bleeding that does not stop/Swelling that continues/Fever greater than 101/Inability to Tolerate Liquids or Foods

## 2019-03-07 NOTE — ASU PATIENT PROFILE, ADULT - PSH
H/O bilateral mastectomy    S/P bilateral breast reduction  2007  S/P TRAM (transverse rectus abdominis muscle) flap breast reconstruction

## 2019-03-08 ENCOUNTER — APPOINTMENT (OUTPATIENT)
Dept: HEMATOLOGY ONCOLOGY | Facility: CLINIC | Age: 49
End: 2019-03-08

## 2019-03-11 LAB — SURGICAL PATHOLOGY FINAL REPORT - CH: SIGNIFICANT CHANGE UP

## 2019-03-12 DIAGNOSIS — Z87.891 PERSONAL HISTORY OF NICOTINE DEPENDENCE: ICD-10-CM

## 2019-03-12 DIAGNOSIS — N64.1 FAT NECROSIS OF BREAST: ICD-10-CM

## 2019-03-12 DIAGNOSIS — Z90.13 ACQUIRED ABSENCE OF BILATERAL BREASTS AND NIPPLES: ICD-10-CM

## 2019-03-12 DIAGNOSIS — Z85.3 PERSONAL HISTORY OF MALIGNANT NEOPLASM OF BREAST: ICD-10-CM

## 2019-03-12 DIAGNOSIS — Z80.52 FAMILY HISTORY OF MALIGNANT NEOPLASM OF BLADDER: ICD-10-CM

## 2019-03-12 DIAGNOSIS — L08.9 LOCAL INFECTION OF THE SKIN AND SUBCUTANEOUS TISSUE, UNSPECIFIED: ICD-10-CM

## 2019-03-12 DIAGNOSIS — F41.9 ANXIETY DISORDER, UNSPECIFIED: ICD-10-CM

## 2019-03-13 ENCOUNTER — LABORATORY RESULT (OUTPATIENT)
Age: 49
End: 2019-03-13

## 2019-03-13 ENCOUNTER — APPOINTMENT (OUTPATIENT)
Dept: HEMATOLOGY ONCOLOGY | Facility: CLINIC | Age: 49
End: 2019-03-13
Payer: COMMERCIAL

## 2019-03-13 VITALS
HEART RATE: 109 BPM | DIASTOLIC BLOOD PRESSURE: 86 MMHG | HEIGHT: 66 IN | WEIGHT: 212.13 LBS | SYSTOLIC BLOOD PRESSURE: 132 MMHG | BODY MASS INDEX: 34.09 KG/M2 | TEMPERATURE: 98.4 F

## 2019-03-13 DIAGNOSIS — Z01.419 ENCOUNTER FOR GYNECOLOGICAL EXAMINATION (GENERAL) (ROUTINE) W/OUT ABNORMAL FINDINGS: ICD-10-CM

## 2019-03-13 LAB
HCT VFR BLD CALC: 34.3 %
HGB BLD-MCNC: 11.4 G/DL
MCHC RBC-ENTMCNC: 30.1 PG
MCHC RBC-ENTMCNC: 33.3 GM/DL
MCV RBC AUTO: 90.5 FL
PLATELET # BLD AUTO: 274 K/UL
RBC # BLD: 3.79 M/UL
RBC # FLD: 12.4 %
WBC # FLD AUTO: 6.9 K/UL

## 2019-03-13 PROCEDURE — 85025 COMPLETE CBC W/AUTO DIFF WBC: CPT

## 2019-03-13 PROCEDURE — 36415 COLL VENOUS BLD VENIPUNCTURE: CPT

## 2019-03-13 PROCEDURE — 99215 OFFICE O/P EST HI 40 MIN: CPT | Mod: 25

## 2019-03-13 RX ORDER — OMEPRAZOLE 40 MG/1
40 CAPSULE, DELAYED RELEASE ORAL
Qty: 90 | Refills: 2 | Status: ACTIVE | COMMUNITY
Start: 2019-03-13 | End: 1900-01-01

## 2019-03-14 ENCOUNTER — APPOINTMENT (OUTPATIENT)
Dept: CARDIOLOGY | Facility: CLINIC | Age: 49
End: 2019-03-14
Payer: COMMERCIAL

## 2019-03-14 PROCEDURE — 93306 TTE W/DOPPLER COMPLETE: CPT

## 2019-03-15 LAB
ALBUMIN SERPL ELPH-MCNC: 4.4 G/DL
ALP BLD-CCNC: 99 U/L
ALT SERPL-CCNC: 9 U/L
ANION GAP SERPL CALC-SCNC: 15 MMOL/L
AST SERPL-CCNC: 18 U/L
BILIRUB SERPL-MCNC: 0.4 MG/DL
BUN SERPL-MCNC: 10 MG/DL
CALCIUM SERPL-MCNC: 9.7 MG/DL
CHLORIDE SERPL-SCNC: 102 MMOL/L
CO2 SERPL-SCNC: 24 MMOL/L
CREAT SERPL-MCNC: 0.56 MG/DL
ESTRADIOL SERPL-MCNC: 5 PG/ML
FERRITIN SERPL-MCNC: 132 NG/ML
FOLATE SERPL-MCNC: 19.3 NG/ML
FSH SERPL-MCNC: 30.2 IU/L
GLUCOSE SERPL-MCNC: 131 MG/DL
IRON SATN MFR SERPL: 11 %
IRON SERPL-MCNC: 35 UG/DL
LH SERPL-ACNC: 21.3 IU/L
POTASSIUM SERPL-SCNC: 4.6 MMOL/L
PROT SERPL-MCNC: 7.1 G/DL
SODIUM SERPL-SCNC: 141 MMOL/L
TIBC SERPL-MCNC: 323 UG/DL
UIBC SERPL-MCNC: 288 UG/DL
VIT B12 SERPL-MCNC: 418 PG/ML

## 2019-03-20 DIAGNOSIS — C77.3 SECONDARY AND UNSPECIFIED MALIGNANT NEOPLASM OF AXILLA AND UPPER LIMB LYMPH NODES: ICD-10-CM

## 2019-03-20 DIAGNOSIS — C50.911 MALIGNANT NEOPLASM OF UNSPECIFIED SITE OF RIGHT FEMALE BREAST: ICD-10-CM

## 2019-03-20 DIAGNOSIS — Z90.13 ACQUIRED ABSENCE OF BILATERAL BREASTS AND NIPPLES: ICD-10-CM

## 2019-03-20 DIAGNOSIS — F41.9 ANXIETY DISORDER, UNSPECIFIED: ICD-10-CM

## 2019-03-20 DIAGNOSIS — Z87.891 PERSONAL HISTORY OF NICOTINE DEPENDENCE: ICD-10-CM

## 2019-03-20 DIAGNOSIS — Z91.040 LATEX ALLERGY STATUS: ICD-10-CM

## 2019-03-26 ENCOUNTER — LABORATORY RESULT (OUTPATIENT)
Age: 49
End: 2019-03-26

## 2019-03-26 ENCOUNTER — APPOINTMENT (OUTPATIENT)
Dept: INFUSION THERAPY | Facility: CLINIC | Age: 49
End: 2019-03-26
Payer: COMMERCIAL

## 2019-03-26 VITALS
HEIGHT: 66 IN | WEIGHT: 211.38 LBS | BODY MASS INDEX: 33.97 KG/M2 | TEMPERATURE: 98 F | DIASTOLIC BLOOD PRESSURE: 81 MMHG | SYSTOLIC BLOOD PRESSURE: 138 MMHG | HEART RATE: 109 BPM

## 2019-03-26 LAB
HCT VFR BLD CALC: 36.6 %
HGB BLD-MCNC: 12.4 G/DL
MCHC RBC-ENTMCNC: 29.9 PG
MCHC RBC-ENTMCNC: 33.7 GM/DL
MCV RBC AUTO: 88.5 FL
PLATELET # BLD AUTO: 266 K/UL
RBC # BLD: 4.14 M/UL
RBC # FLD: 12.1 %
WBC # FLD AUTO: 5.2 K/UL

## 2019-03-26 PROCEDURE — 96367 TX/PROPH/DG ADDL SEQ IV INF: CPT

## 2019-03-26 PROCEDURE — 96372 THER/PROPH/DIAG INJ SC/IM: CPT | Mod: 59

## 2019-03-26 PROCEDURE — 36415 COLL VENOUS BLD VENIPUNCTURE: CPT

## 2019-03-26 PROCEDURE — 96375 TX/PRO/DX INJ NEW DRUG ADDON: CPT

## 2019-03-26 PROCEDURE — 85025 COMPLETE CBC W/AUTO DIFF WBC: CPT

## 2019-03-26 PROCEDURE — 96411 CHEMO IV PUSH ADDL DRUG: CPT

## 2019-03-26 PROCEDURE — 96413 CHEMO IV INFUSION 1 HR: CPT

## 2019-04-02 ENCOUNTER — LABORATORY RESULT (OUTPATIENT)
Age: 49
End: 2019-04-02

## 2019-04-02 ENCOUNTER — APPOINTMENT (OUTPATIENT)
Dept: HEMATOLOGY ONCOLOGY | Facility: CLINIC | Age: 49
End: 2019-04-02
Payer: COMMERCIAL

## 2019-04-02 VITALS
HEART RATE: 118 BPM | WEIGHT: 211 LBS | HEIGHT: 66 IN | DIASTOLIC BLOOD PRESSURE: 81 MMHG | SYSTOLIC BLOOD PRESSURE: 124 MMHG | BODY MASS INDEX: 33.91 KG/M2 | TEMPERATURE: 98.4 F

## 2019-04-02 LAB
HCT VFR BLD CALC: 35.4 %
HGB BLD-MCNC: 11.7 G/DL
MCHC RBC-ENTMCNC: 29.4 PG
MCHC RBC-ENTMCNC: 33.1 GM/DL
MCV RBC AUTO: 88.7 FL
PLATELET # BLD AUTO: 190 K/UL
RBC # BLD: 4 M/UL
RBC # FLD: 11.4 %
WBC # FLD AUTO: 3 K/UL

## 2019-04-02 PROCEDURE — 99214 OFFICE O/P EST MOD 30 MIN: CPT | Mod: 25

## 2019-04-02 PROCEDURE — 36415 COLL VENOUS BLD VENIPUNCTURE: CPT

## 2019-04-02 PROCEDURE — 85025 COMPLETE CBC W/AUTO DIFF WBC: CPT

## 2019-04-02 NOTE — HISTORY OF PRESENT ILLNESS
[Disease: _____________________] : Disease: [unfilled] [T: ___] : T[unfilled] [N: ___] : N[unfilled] [AJCC Stage: ____] : AJCC Stage: [unfilled] [de-identified] : 47 y/o perimenopausal woman presented with an abnormality in her right breast  on a screening mammogram . Biopsy confirmed cancer. Breast MRI showed 5.5 area on enhancement around biopsy site. Patient elected to have bilateral mastectomies. Genetic testing Color was negative.\par On 2/13/19 she underwent nipple sparing mastectomies, right sentinel LN biopsy was positive- followed by completion of axillary dissection and LYMPHA procedure and JASMYNE flap reconstruction.\par Pathology revealed 5 mm invasive ductal cancer mod differentiated , strongly ER/FL positive and HER 2 negative , metastatic to 10/14 axillary LNs ( largest metastasis 1 cm  with extranodal extension). \par \par CT chest, abdomen and pelvis and bone scan - negative for metastatic disease ( PET not covered by insurance).\par  \par Started adjuvant chemotherapy DD ACT on 3/26/19. \par \par  [de-identified] : moderately differentiated invasive ductal cancer  SBR 6/9  [de-identified] : ER 95 %  MA 95 %  HER 2 + 1 neg ( biopsy)  [de-identified] : Genetic testing Color negative [de-identified] : Had first cycle of AC on 3/26/19.\par Tolerated well. Fatigue- improving. Mild constipation. No nausea. No mucositis. \par \par Nipple area ( had debridement)- healing OK- has follow up today with plastic surgery.\par \par \par

## 2019-04-02 NOTE — ASSESSMENT
[FreeTextEntry1] : 47 y/o perimenopausal with invasive ductal cancer of right breast T1a, N3 stage III C disease strongly ER/SC positive and HER 2 negative. \par S/p bilateral mastectomies , right  axillary  dissection with LYMPHa procedure and JASMYNE reconstruction.\par \par On adjuvant chemotherapy with DD ACT.\par \par Today cycle 1 day 8 of AC. Tolerating very well.\par \par Continue chemotherapy as scheduled.\par \par \par Consider Taxol- dose dense ( every 2 weeks with Neulasta)- she is tolerating chemotherapy well and wants to finish it sooner.\par \par Per recommendation of Breast tumor board- will ask for ER/SC receptor on metastatic disease in LN ( if negative- might consider adding platinum to adjuvant taxane).\par \par \par Return visit in one week for AC # 2.

## 2019-04-02 NOTE — PHYSICAL EXAM
[Obese] : obese [Normal] : affect appropriate [de-identified] : looks well  [de-identified] : no mucositis [de-identified] : Mediport left upper chest  [de-identified] : s/p bilateral mastectomies nipple sparing with JASMYNE reconstruction, nipple area Rt breast healing well  [de-identified] : healed surgical incision

## 2019-04-03 LAB
ALBUMIN SERPL ELPH-MCNC: 4.8 G/DL
ALP BLD-CCNC: 114 U/L
ALT SERPL-CCNC: 13 U/L
ANION GAP SERPL CALC-SCNC: 14 MMOL/L
AST SERPL-CCNC: 18 U/L
BILIRUB SERPL-MCNC: 0.2 MG/DL
BUN SERPL-MCNC: 10 MG/DL
CALCIUM SERPL-MCNC: 9.6 MG/DL
CHLORIDE SERPL-SCNC: 101 MMOL/L
CO2 SERPL-SCNC: 25 MMOL/L
CREAT SERPL-MCNC: 0.53 MG/DL
GLUCOSE SERPL-MCNC: 131 MG/DL
POTASSIUM SERPL-SCNC: 3.9 MMOL/L
PROT SERPL-MCNC: 7.1 G/DL
SODIUM SERPL-SCNC: 140 MMOL/L

## 2019-04-09 ENCOUNTER — APPOINTMENT (OUTPATIENT)
Dept: INFUSION THERAPY | Facility: CLINIC | Age: 49
End: 2019-04-09
Payer: COMMERCIAL

## 2019-04-09 ENCOUNTER — LABORATORY RESULT (OUTPATIENT)
Age: 49
End: 2019-04-09

## 2019-04-09 VITALS
TEMPERATURE: 98.3 F | WEIGHT: 216 LBS | HEIGHT: 66 IN | HEART RATE: 98 BPM | DIASTOLIC BLOOD PRESSURE: 87 MMHG | BODY MASS INDEX: 34.72 KG/M2 | SYSTOLIC BLOOD PRESSURE: 141 MMHG

## 2019-04-09 LAB
HCT VFR BLD CALC: 35 %
HGB BLD-MCNC: 11.8 G/DL
MCHC RBC-ENTMCNC: 29.5 PG
MCHC RBC-ENTMCNC: 33.6 GM/DL
MCV RBC AUTO: 87.7 FL
PLATELET # BLD AUTO: 164 K/UL
RBC # BLD: 3.98 M/UL
RBC # FLD: 12.8 %
WBC # FLD AUTO: 4.1 K/UL

## 2019-04-09 PROCEDURE — 96367 TX/PROPH/DG ADDL SEQ IV INF: CPT

## 2019-04-09 PROCEDURE — 96375 TX/PRO/DX INJ NEW DRUG ADDON: CPT

## 2019-04-09 PROCEDURE — 85025 COMPLETE CBC W/AUTO DIFF WBC: CPT

## 2019-04-09 PROCEDURE — 96413 CHEMO IV INFUSION 1 HR: CPT

## 2019-04-09 PROCEDURE — 96372 THER/PROPH/DIAG INJ SC/IM: CPT | Mod: 59

## 2019-04-09 PROCEDURE — 36415 COLL VENOUS BLD VENIPUNCTURE: CPT

## 2019-04-09 PROCEDURE — 96411 CHEMO IV PUSH ADDL DRUG: CPT

## 2019-04-16 ENCOUNTER — APPOINTMENT (OUTPATIENT)
Dept: HEMATOLOGY ONCOLOGY | Facility: CLINIC | Age: 49
End: 2019-04-16
Payer: COMMERCIAL

## 2019-04-16 ENCOUNTER — LABORATORY RESULT (OUTPATIENT)
Age: 49
End: 2019-04-16

## 2019-04-16 VITALS
DIASTOLIC BLOOD PRESSURE: 83 MMHG | HEIGHT: 66 IN | SYSTOLIC BLOOD PRESSURE: 113 MMHG | HEART RATE: 114 BPM | WEIGHT: 213 LBS | TEMPERATURE: 98.3 F | BODY MASS INDEX: 34.23 KG/M2

## 2019-04-16 LAB
HCT VFR BLD CALC: 36.3 %
HGB BLD-MCNC: 12.4 G/DL
MCHC RBC-ENTMCNC: 29.5 PG
MCHC RBC-ENTMCNC: 34.1 GM/DL
MCV RBC AUTO: 86.5 FL
PLATELET # BLD AUTO: 325 K/UL
RBC # BLD: 4.19 M/UL
RBC # FLD: 12.9 %
WBC # FLD AUTO: 3.8 K/UL

## 2019-04-16 PROCEDURE — 36415 COLL VENOUS BLD VENIPUNCTURE: CPT

## 2019-04-16 PROCEDURE — 99214 OFFICE O/P EST MOD 30 MIN: CPT | Mod: 25

## 2019-04-16 PROCEDURE — 85025 COMPLETE CBC W/AUTO DIFF WBC: CPT

## 2019-04-22 LAB
ALBUMIN SERPL ELPH-MCNC: 4.9 G/DL
ALP BLD-CCNC: 128 U/L
ALT SERPL-CCNC: 11 U/L
ANION GAP SERPL CALC-SCNC: 14 MMOL/L
AST SERPL-CCNC: 16 U/L
BILIRUB SERPL-MCNC: 0.2 MG/DL
BUN SERPL-MCNC: 11 MG/DL
CALCIUM SERPL-MCNC: 9.8 MG/DL
CHLORIDE SERPL-SCNC: 102 MMOL/L
CO2 SERPL-SCNC: 23 MMOL/L
CREAT SERPL-MCNC: 0.49 MG/DL
GLUCOSE SERPL-MCNC: 114 MG/DL
POTASSIUM SERPL-SCNC: 4.4 MMOL/L
PROT SERPL-MCNC: 7.4 G/DL
SODIUM SERPL-SCNC: 139 MMOL/L

## 2019-04-23 ENCOUNTER — APPOINTMENT (OUTPATIENT)
Dept: INFUSION THERAPY | Facility: CLINIC | Age: 49
End: 2019-04-23
Payer: COMMERCIAL

## 2019-04-23 ENCOUNTER — LABORATORY RESULT (OUTPATIENT)
Age: 49
End: 2019-04-23

## 2019-04-23 VITALS
TEMPERATURE: 97.4 F | HEIGHT: 66 IN | HEART RATE: 89 BPM | SYSTOLIC BLOOD PRESSURE: 135 MMHG | DIASTOLIC BLOOD PRESSURE: 86 MMHG | BODY MASS INDEX: 34.23 KG/M2 | WEIGHT: 213 LBS

## 2019-04-23 PROCEDURE — 96372 THER/PROPH/DIAG INJ SC/IM: CPT | Mod: 59

## 2019-04-23 PROCEDURE — 36415 COLL VENOUS BLD VENIPUNCTURE: CPT

## 2019-04-23 PROCEDURE — 85025 COMPLETE CBC W/AUTO DIFF WBC: CPT

## 2019-04-23 PROCEDURE — 96413 CHEMO IV INFUSION 1 HR: CPT

## 2019-04-23 PROCEDURE — 96411 CHEMO IV PUSH ADDL DRUG: CPT

## 2019-04-23 PROCEDURE — 96375 TX/PRO/DX INJ NEW DRUG ADDON: CPT

## 2019-04-23 PROCEDURE — 96367 TX/PROPH/DG ADDL SEQ IV INF: CPT

## 2019-04-24 LAB
HCT VFR BLD CALC: 35.9 %
HGB BLD-MCNC: 12.1 G/DL
MCHC RBC-ENTMCNC: 29.2 PG
MCHC RBC-ENTMCNC: 33.7 GM/DL
MCV RBC AUTO: 86.6 FL
PLATELET # BLD AUTO: 189 K/UL
RBC # BLD: 4.15 M/UL
RBC # FLD: 13 %
WBC # FLD AUTO: 6.3 K/UL

## 2019-04-30 ENCOUNTER — LABORATORY RESULT (OUTPATIENT)
Age: 49
End: 2019-04-30

## 2019-04-30 ENCOUNTER — APPOINTMENT (OUTPATIENT)
Dept: HEMATOLOGY ONCOLOGY | Facility: CLINIC | Age: 49
End: 2019-04-30
Payer: COMMERCIAL

## 2019-04-30 VITALS
WEIGHT: 215 LBS | BODY MASS INDEX: 34.55 KG/M2 | HEART RATE: 116 BPM | DIASTOLIC BLOOD PRESSURE: 92 MMHG | SYSTOLIC BLOOD PRESSURE: 138 MMHG | TEMPERATURE: 98 F | HEIGHT: 66 IN

## 2019-04-30 LAB
HCT VFR BLD CALC: 32.9 %
HGB BLD-MCNC: 11.3 G/DL
MCHC RBC-ENTMCNC: 29.9 PG
MCHC RBC-ENTMCNC: 34.4 GM/DL
MCV RBC AUTO: 87 FL
PLATELET # BLD AUTO: 221 K/UL
RBC # BLD: 3.78 M/UL
RBC # FLD: 13.5 %
WBC # FLD AUTO: 3.2 K/UL

## 2019-04-30 PROCEDURE — 99214 OFFICE O/P EST MOD 30 MIN: CPT | Mod: 25

## 2019-04-30 PROCEDURE — 36415 COLL VENOUS BLD VENIPUNCTURE: CPT

## 2019-04-30 PROCEDURE — 85025 COMPLETE CBC W/AUTO DIFF WBC: CPT

## 2019-04-30 RX ORDER — OXYCODONE 5 MG/1
5 TABLET ORAL
Refills: 0 | Status: DISCONTINUED | COMMUNITY
Start: 2019-02-26 | End: 2019-04-30

## 2019-04-30 RX ORDER — ALPRAZOLAM 0.25 MG/1
0.25 TABLET ORAL
Qty: 60 | Refills: 0 | Status: DISCONTINUED | COMMUNITY
Start: 2019-04-16 | End: 2019-04-30

## 2019-04-30 NOTE — ASSESSMENT
[FreeTextEntry1] : 49 y/o perimenopausal with invasive ductal cancer of right breast T1a, N3 stage III C disease strongly ER/LA positive and HER 2 negative. \par S/p bilateral mastectomies , right  axillary  dissection with LYMPHa procedure and JASMYNE reconstruction.\par \par On adjuvant chemotherapy with DD ACT.\par \par Today cycle 2 day 8 of AC. Tolerating very well.\par \par Continue chemotherapy as scheduled.\par \par Will give  Taxol- dose dense ( every 2 weeks with Neulasta)- she is tolerating chemotherapy well and wants to finish it sooner.\par \par Discussed adjuvant radiation- she will schedule a consultation with Dr Ken.\par \par Return visit in one week for AC # 3.

## 2019-04-30 NOTE — REVIEW OF SYSTEMS
[Patient Intake Form Reviewed] : Patient intake form was reviewed [Insomnia] : insomnia [Negative] : Endocrine [Fatigue] : no fatigue

## 2019-04-30 NOTE — PHYSICAL EXAM
[Obese] : obese [Normal] : affect appropriate [de-identified] : looks well  [de-identified] : no mucositis [de-identified] : Mediport left upper chest  [de-identified] : s/p bilateral mastectomies nipple sparing with JASMYNE reconstruction

## 2019-04-30 NOTE — HISTORY OF PRESENT ILLNESS
[Disease: _____________________] : Disease: [unfilled] [T: ___] : T[unfilled] [N: ___] : N[unfilled] [AJCC Stage: ____] : AJCC Stage: [unfilled] [de-identified] : 47 y/o perimenopausal woman presented with an abnormality in her right breast  on a screening mammogram . Biopsy confirmed cancer. Breast MRI showed 5.5 area on enhancement around biopsy site. Patient elected to have bilateral mastectomies. Genetic testing Color was negative.\par On 2/13/19 she underwent nipple sparing mastectomies, right sentinel LN biopsy was positive- followed by completion of axillary dissection and LYMPHA procedure and JASMYNE flap reconstruction.\par Pathology revealed 5 mm invasive ductal cancer mod differentiated , strongly ER/CO positive and HER 2 negative , metastatic to 10/14 axillary LNs ( largest metastasis 1 cm  with extranodal extension). \par \par CT chest, abdomen and pelvis and bone scan - negative for metastatic disease ( PET not covered by insurance).\par  \par Started adjuvant chemotherapy DD ACT on 3/26/19. \par \par  [de-identified] : moderately differentiated invasive ductal cancer  SBR 6/9  [de-identified] : ER 95 %  AK 95 %  HER 2 + 1 neg ( biopsy)  [de-identified] : Genetic testing Color negative\par \par Receptor studies on metastatic  lymph node  ER> 95 % [place] > 95 %  HER 2 neg [de-identified] : Third cycle of  AC on 4/23/19.\par \par Tolerating chemotherapy very well. Minimal mucositis after last treatment. Mild nausea- does not need to take nausea meds. Slight aches after neulasta- takes Claritin which helps. \par \par Difficulty sleeping- Xanax at bedtime helps a lot. \par \par

## 2019-04-30 NOTE — REVIEW OF SYSTEMS
[Patient Intake Form Reviewed] : Patient intake form was reviewed [Insomnia] : insomnia [Negative] : Constitutional [Fatigue] : no fatigue

## 2019-04-30 NOTE — ASSESSMENT
[FreeTextEntry1] : 47 y/o perimenopausal with invasive ductal cancer of right breast T1a, N3 stage III C disease strongly ER/VT positive and HER 2 negative. \par S/p bilateral mastectomies , right  axillary  dissection with LYMPHa procedure and JASMYNE reconstruction.\par \par On adjuvant chemotherapy with DD ACT.\par \par Today cycle 3 day 8 of  DD AC. Tolerating very well.\par \par Continue chemotherapy as scheduled.\par \par Will give  Taxol- dose dense ( every 2 weeks with Neulasta)- she is tolerating chemotherapy well and wants to finish it sooner.\par \par Needs  adjuvant radiation- she has an appointment with Dr Ken. \par \par Return visit in one week for AC # 4/4.

## 2019-04-30 NOTE — PHYSICAL EXAM
[Obese] : obese [Normal] : affect appropriate [de-identified] : looks well  [de-identified] : no mucositis [de-identified] : Mediport left upper chest  [de-identified] : s/p bilateral mastectomies nipple sparing with JASMYNE reconstruction

## 2019-05-03 LAB
ALBUMIN SERPL ELPH-MCNC: 4.8 G/DL
ALP BLD-CCNC: 138 U/L
ALT SERPL-CCNC: 14 U/L
ANION GAP SERPL CALC-SCNC: 15 MMOL/L
AST SERPL-CCNC: 15 U/L
BILIRUB SERPL-MCNC: 0.2 MG/DL
BUN SERPL-MCNC: 10 MG/DL
CALCIUM SERPL-MCNC: 9.8 MG/DL
CHLORIDE SERPL-SCNC: 99 MMOL/L
CO2 SERPL-SCNC: 25 MMOL/L
CREAT SERPL-MCNC: 0.48 MG/DL
GLUCOSE SERPL-MCNC: 118 MG/DL
POTASSIUM SERPL-SCNC: 4.3 MMOL/L
PROT SERPL-MCNC: 7.1 G/DL
SODIUM SERPL-SCNC: 139 MMOL/L

## 2019-05-07 ENCOUNTER — APPOINTMENT (OUTPATIENT)
Dept: INFUSION THERAPY | Facility: CLINIC | Age: 49
End: 2019-05-07
Payer: COMMERCIAL

## 2019-05-07 ENCOUNTER — LABORATORY RESULT (OUTPATIENT)
Age: 49
End: 2019-05-07

## 2019-05-07 VITALS
HEIGHT: 66 IN | WEIGHT: 216 LBS | TEMPERATURE: 98.4 F | HEART RATE: 98 BPM | BODY MASS INDEX: 34.72 KG/M2 | SYSTOLIC BLOOD PRESSURE: 127 MMHG | DIASTOLIC BLOOD PRESSURE: 71 MMHG

## 2019-05-07 LAB
HCT VFR BLD CALC: 34.4 %
HGB BLD-MCNC: 11.6 G/DL
MCHC RBC-ENTMCNC: 29.4 PG
MCHC RBC-ENTMCNC: 33.6 GM/DL
MCV RBC AUTO: 87.7 FL
PLATELET # BLD AUTO: 192 K/UL
RBC # BLD: 3.93 M/UL
RBC # FLD: 14.5 %
WBC # FLD AUTO: 6.3 K/UL

## 2019-05-07 PROCEDURE — 96367 TX/PROPH/DG ADDL SEQ IV INF: CPT

## 2019-05-07 PROCEDURE — 96372 THER/PROPH/DIAG INJ SC/IM: CPT | Mod: 59

## 2019-05-07 PROCEDURE — 36415 COLL VENOUS BLD VENIPUNCTURE: CPT

## 2019-05-07 PROCEDURE — 96413 CHEMO IV INFUSION 1 HR: CPT

## 2019-05-07 PROCEDURE — 96411 CHEMO IV PUSH ADDL DRUG: CPT

## 2019-05-07 PROCEDURE — 85025 COMPLETE CBC W/AUTO DIFF WBC: CPT

## 2019-05-07 PROCEDURE — 96375 TX/PRO/DX INJ NEW DRUG ADDON: CPT

## 2019-05-14 ENCOUNTER — LABORATORY RESULT (OUTPATIENT)
Age: 49
End: 2019-05-14

## 2019-05-14 ENCOUNTER — APPOINTMENT (OUTPATIENT)
Dept: HEMATOLOGY ONCOLOGY | Facility: CLINIC | Age: 49
End: 2019-05-14
Payer: COMMERCIAL

## 2019-05-14 VITALS
HEIGHT: 66 IN | HEART RATE: 120 BPM | DIASTOLIC BLOOD PRESSURE: 76 MMHG | WEIGHT: 215 LBS | BODY MASS INDEX: 34.55 KG/M2 | SYSTOLIC BLOOD PRESSURE: 122 MMHG | TEMPERATURE: 97.7 F

## 2019-05-14 LAB
HCT VFR BLD CALC: 34.4 %
HGB BLD-MCNC: 11.6 G/DL
MCHC RBC-ENTMCNC: 29.8 PG
MCHC RBC-ENTMCNC: 33.8 GM/DL
MCV RBC AUTO: 88.1 FL
PLATELET # BLD AUTO: 262 K/UL
RBC # BLD: 3.9 M/UL
RBC # FLD: 14.9 %
WBC # FLD AUTO: 4.4 K/UL

## 2019-05-14 PROCEDURE — 85025 COMPLETE CBC W/AUTO DIFF WBC: CPT

## 2019-05-14 PROCEDURE — 99214 OFFICE O/P EST MOD 30 MIN: CPT | Mod: 25

## 2019-05-14 PROCEDURE — 36415 COLL VENOUS BLD VENIPUNCTURE: CPT

## 2019-05-15 LAB
ALBUMIN SERPL ELPH-MCNC: 5.1 G/DL
ALP BLD-CCNC: 157 U/L
ALT SERPL-CCNC: 15 U/L
ANION GAP SERPL CALC-SCNC: 13 MMOL/L
AST SERPL-CCNC: 19 U/L
BILIRUB SERPL-MCNC: 0.4 MG/DL
BUN SERPL-MCNC: 13 MG/DL
CALCIUM SERPL-MCNC: 9.7 MG/DL
CHLORIDE SERPL-SCNC: 102 MMOL/L
CO2 SERPL-SCNC: 25 MMOL/L
CREAT SERPL-MCNC: 0.54 MG/DL
GLUCOSE SERPL-MCNC: 124 MG/DL
POTASSIUM SERPL-SCNC: 4.6 MMOL/L
PROT SERPL-MCNC: 7.6 G/DL
SODIUM SERPL-SCNC: 140 MMOL/L

## 2019-05-21 ENCOUNTER — APPOINTMENT (OUTPATIENT)
Dept: INFUSION THERAPY | Facility: CLINIC | Age: 49
End: 2019-05-21
Payer: COMMERCIAL

## 2019-05-21 ENCOUNTER — LABORATORY RESULT (OUTPATIENT)
Age: 49
End: 2019-05-21

## 2019-05-21 VITALS
HEART RATE: 114 BPM | DIASTOLIC BLOOD PRESSURE: 86 MMHG | HEIGHT: 66 IN | SYSTOLIC BLOOD PRESSURE: 145 MMHG | BODY MASS INDEX: 34.55 KG/M2 | TEMPERATURE: 97.9 F | WEIGHT: 215 LBS

## 2019-05-21 LAB
HCT VFR BLD CALC: 35.1 %
HGB BLD-MCNC: 11.8 G/DL
MCHC RBC-ENTMCNC: 29.5 PG
MCHC RBC-ENTMCNC: 33.6 GM/DL
MCV RBC AUTO: 87.8 FL
PLATELET # BLD AUTO: 194 K/UL
RBC # BLD: 4 M/UL
RBC # FLD: 15.8 %
WBC # FLD AUTO: 7.8 K/UL

## 2019-05-21 PROCEDURE — 96415 CHEMO IV INFUSION ADDL HR: CPT

## 2019-05-21 PROCEDURE — 96367 TX/PROPH/DG ADDL SEQ IV INF: CPT

## 2019-05-21 PROCEDURE — 85025 COMPLETE CBC W/AUTO DIFF WBC: CPT

## 2019-05-21 PROCEDURE — 96413 CHEMO IV INFUSION 1 HR: CPT

## 2019-05-21 PROCEDURE — 36415 COLL VENOUS BLD VENIPUNCTURE: CPT

## 2019-05-21 PROCEDURE — 96372 THER/PROPH/DIAG INJ SC/IM: CPT | Mod: 59

## 2019-05-21 PROCEDURE — 96375 TX/PRO/DX INJ NEW DRUG ADDON: CPT

## 2019-05-28 ENCOUNTER — LABORATORY RESULT (OUTPATIENT)
Age: 49
End: 2019-05-28

## 2019-05-28 ENCOUNTER — APPOINTMENT (OUTPATIENT)
Dept: HEMATOLOGY ONCOLOGY | Facility: CLINIC | Age: 49
End: 2019-05-28
Payer: COMMERCIAL

## 2019-05-28 VITALS
HEART RATE: 105 BPM | TEMPERATURE: 98.4 F | SYSTOLIC BLOOD PRESSURE: 134 MMHG | HEIGHT: 66 IN | BODY MASS INDEX: 34.39 KG/M2 | DIASTOLIC BLOOD PRESSURE: 83 MMHG | WEIGHT: 214 LBS

## 2019-05-28 LAB
HCT VFR BLD CALC: 32.8 %
HGB BLD-MCNC: 11.4 G/DL
MCHC RBC-ENTMCNC: 30.8 PG
MCHC RBC-ENTMCNC: 34.8 GM/DL
MCV RBC AUTO: 88.4 FL
PLATELET # BLD AUTO: 226 K/UL
RBC # BLD: 3.71 M/UL
RBC # FLD: 16.3 %
WBC # FLD AUTO: 28.7 K/UL

## 2019-05-28 PROCEDURE — 85025 COMPLETE CBC W/AUTO DIFF WBC: CPT

## 2019-05-28 PROCEDURE — 36415 COLL VENOUS BLD VENIPUNCTURE: CPT

## 2019-05-28 PROCEDURE — 99214 OFFICE O/P EST MOD 30 MIN: CPT | Mod: 25

## 2019-05-28 NOTE — REVIEW OF SYSTEMS
[Patient Intake Form Reviewed] : Patient intake form was reviewed [Insomnia] : insomnia [Negative] : Neurological [Fatigue] : no fatigue

## 2019-05-28 NOTE — PHYSICAL EXAM
[Obese] : obese [Normal] : affect appropriate [de-identified] : no mucositis [de-identified] : looks well  [de-identified] : Mediport left upper chest  [de-identified] : s/p bilateral mastectomies nipple sparing with JASMYNE reconstruction

## 2019-05-28 NOTE — ASSESSMENT
[FreeTextEntry1] : 47 y/o perimenopausal with invasive ductal cancer of right breast T1a, N3 stage III C disease strongly ER/IN positive and HER 2 negative. \par S/p bilateral mastectomies , right  axillary  dissection with LYMPHA  procedure and JASMYNE reconstruction.\par \par On adjuvant chemotherapy with DD ACT.\par \par S?p dose dense Taxol # 1. Tolerated well.\par \par Continue chemotherapy as scheduled.\par \par \par Needs  adjuvant radiation- rad oncologist Dr Ken. \par \par Return visit in one week for Taxol   # 2/4..

## 2019-05-28 NOTE — HISTORY OF PRESENT ILLNESS
[Disease: _____________________] : Disease: [unfilled] [T: ___] : T[unfilled] [N: ___] : N[unfilled] [AJCC Stage: ____] : AJCC Stage: [unfilled] [de-identified] : 49 y/o perimenopausal woman presented with an abnormality in her right breast  on a screening mammogram . Biopsy confirmed cancer. Breast MRI showed 5.5 area on enhancement around biopsy site. Patient elected to have bilateral mastectomies. Genetic testing Color was negative.\par On 2/13/19 she underwent nipple sparing mastectomies, right sentinel LN biopsy was positive- followed by completion of axillary dissection and LYMPHA procedure and JASMYNE flap reconstruction.\par Pathology revealed 5 mm invasive ductal cancer mod differentiated , strongly ER/MI positive and HER 2 negative , metastatic to 10/14 axillary LNs ( largest metastasis 1 cm  with extranodal extension). \par \par CT chest, abdomen and pelvis and bone scan - negative for metastatic disease ( PET not covered by insurance).\par  \par Started adjuvant chemotherapy DD ACT on 3/26/19. \par \par Currently receiving dose dense Taxol. \par \par  [de-identified] : ER 95 %  SC 95 %  HER 2 + 1 neg ( biopsy)  [de-identified] : moderately differentiated invasive ductal cancer  SBR 6/9  [de-identified] : First cycle of dose dense Taxol on 5/21/19.\par Tolerated well. HAd more joint/ bone pains after neulasta but managed with tylenol and Advil. No neuropathy. No nausea. Minimal mucositis- resolved. \par  [de-identified] : Genetic testing Color negative\par \par Receptor studies on metastatic  lymph node  ER> 95 % [place] > 95 %  HER 2 neg

## 2019-05-30 ENCOUNTER — APPOINTMENT (OUTPATIENT)
Dept: HEMATOLOGY ONCOLOGY | Facility: CLINIC | Age: 49
End: 2019-05-30
Payer: COMMERCIAL

## 2019-05-30 VITALS
HEIGHT: 66 IN | DIASTOLIC BLOOD PRESSURE: 93 MMHG | HEART RATE: 101 BPM | RESPIRATION RATE: 12 BRPM | SYSTOLIC BLOOD PRESSURE: 132 MMHG

## 2019-05-30 PROCEDURE — 36415 COLL VENOUS BLD VENIPUNCTURE: CPT

## 2019-05-31 LAB
ALP BLD-CCNC: 178 U/L
ALT SERPL-CCNC: 114 U/L
AST SERPL-CCNC: 135 U/L

## 2019-06-03 ENCOUNTER — APPOINTMENT (OUTPATIENT)
Dept: HEMATOLOGY ONCOLOGY | Facility: CLINIC | Age: 49
End: 2019-06-03
Payer: COMMERCIAL

## 2019-06-03 ENCOUNTER — LABORATORY RESULT (OUTPATIENT)
Age: 49
End: 2019-06-03

## 2019-06-03 ENCOUNTER — OTHER (OUTPATIENT)
Age: 49
End: 2019-06-03

## 2019-06-03 VITALS — SYSTOLIC BLOOD PRESSURE: 128 MMHG | HEART RATE: 91 BPM | DIASTOLIC BLOOD PRESSURE: 79 MMHG

## 2019-06-03 LAB
ALBUMIN SERPL ELPH-MCNC: 4.6 G/DL
ALP BLD-CCNC: 118 U/L
ALT SERPL-CCNC: 51 U/L
ANION GAP SERPL CALC-SCNC: 13 MMOL/L
AST SERPL-CCNC: 45 U/L
BILIRUB SERPL-MCNC: 0.4 MG/DL
BUN SERPL-MCNC: 10 MG/DL
CALCIUM SERPL-MCNC: 9.6 MG/DL
CHLORIDE SERPL-SCNC: 103 MMOL/L
CO2 SERPL-SCNC: 24 MMOL/L
CREAT SERPL-MCNC: 0.56 MG/DL
GLUCOSE SERPL-MCNC: 118 MG/DL
HCT VFR BLD CALC: 30.6 %
HGB BLD-MCNC: 10.6 G/DL
MCHC RBC-ENTMCNC: 30.9 PG
MCHC RBC-ENTMCNC: 34.6 GM/DL
MCV RBC AUTO: 89.4 FL
PLATELET # BLD AUTO: 153 K/UL
POTASSIUM SERPL-SCNC: 4.3 MMOL/L
PROT SERPL-MCNC: 6.7 G/DL
RBC # BLD: 3.42 M/UL
RBC # FLD: 17 %
SODIUM SERPL-SCNC: 140 MMOL/L
WBC # FLD AUTO: 5.7 K/UL

## 2019-06-03 PROCEDURE — 85025 COMPLETE CBC W/AUTO DIFF WBC: CPT

## 2019-06-03 PROCEDURE — 36415 COLL VENOUS BLD VENIPUNCTURE: CPT

## 2019-06-04 ENCOUNTER — APPOINTMENT (OUTPATIENT)
Dept: INFUSION THERAPY | Facility: CLINIC | Age: 49
End: 2019-06-04
Payer: COMMERCIAL

## 2019-06-04 VITALS
TEMPERATURE: 98 F | HEIGHT: 66 IN | BODY MASS INDEX: 34.39 KG/M2 | DIASTOLIC BLOOD PRESSURE: 88 MMHG | SYSTOLIC BLOOD PRESSURE: 136 MMHG | HEART RATE: 121 BPM | WEIGHT: 214 LBS | RESPIRATION RATE: 20 BRPM

## 2019-06-04 LAB
HBV CORE IGG+IGM SER QL: NONREACTIVE
HBV SURFACE AB SER QL: NONREACTIVE
HBV SURFACE AG SER QL: NONREACTIVE
HCV AB SER QL: NONREACTIVE
HCV S/CO RATIO: 0.17 S/CO

## 2019-06-04 PROCEDURE — 96367 TX/PROPH/DG ADDL SEQ IV INF: CPT

## 2019-06-04 PROCEDURE — 96415 CHEMO IV INFUSION ADDL HR: CPT

## 2019-06-04 PROCEDURE — 96413 CHEMO IV INFUSION 1 HR: CPT

## 2019-06-04 PROCEDURE — 96372 THER/PROPH/DIAG INJ SC/IM: CPT | Mod: 59

## 2019-06-13 ENCOUNTER — APPOINTMENT (OUTPATIENT)
Dept: HEMATOLOGY ONCOLOGY | Facility: CLINIC | Age: 49
End: 2019-06-13
Payer: COMMERCIAL

## 2019-06-13 ENCOUNTER — LABORATORY RESULT (OUTPATIENT)
Age: 49
End: 2019-06-13

## 2019-06-13 VITALS
SYSTOLIC BLOOD PRESSURE: 129 MMHG | HEART RATE: 92 BPM | DIASTOLIC BLOOD PRESSURE: 89 MMHG | RESPIRATION RATE: 20 BRPM | TEMPERATURE: 98.3 F | HEIGHT: 66 IN

## 2019-06-13 LAB
HCT VFR BLD CALC: 33.6 %
HGB BLD-MCNC: 10.9 G/DL
MCHC RBC-ENTMCNC: 30.7 PG
MCHC RBC-ENTMCNC: 32.6 GM/DL
MCV RBC AUTO: 94.3 FL
PLATELET # BLD AUTO: 207 K/UL
RBC # BLD: 3.56 M/UL
RBC # FLD: 16.9 %
WBC # FLD AUTO: 10.1 K/UL

## 2019-06-13 PROCEDURE — 36415 COLL VENOUS BLD VENIPUNCTURE: CPT

## 2019-06-13 PROCEDURE — 85025 COMPLETE CBC W/AUTO DIFF WBC: CPT

## 2019-06-14 LAB
ALBUMIN SERPL ELPH-MCNC: 4.5 G/DL
ALP BLD-CCNC: 151 U/L
ALT SERPL-CCNC: 37 U/L
ANION GAP SERPL CALC-SCNC: 12 MMOL/L
AST SERPL-CCNC: 32 U/L
BILIRUB SERPL-MCNC: 0.4 MG/DL
BUN SERPL-MCNC: 10 MG/DL
CALCIUM SERPL-MCNC: 9.6 MG/DL
CHLORIDE SERPL-SCNC: 103 MMOL/L
CO2 SERPL-SCNC: 25 MMOL/L
CREAT SERPL-MCNC: 0.6 MG/DL
GLUCOSE SERPL-MCNC: 110 MG/DL
POTASSIUM SERPL-SCNC: 4.6 MMOL/L
PROT SERPL-MCNC: 6.8 G/DL
SODIUM SERPL-SCNC: 140 MMOL/L

## 2019-06-17 LAB
ALBUMIN SERPL ELPH-MCNC: 4.8 G/DL
ALP BLD-CCNC: 199 U/L
ALT SERPL-CCNC: 56 U/L
ANION GAP SERPL CALC-SCNC: 14 MMOL/L
AST SERPL-CCNC: 46 U/L
BILIRUB SERPL-MCNC: 0.2 MG/DL
BUN SERPL-MCNC: 16 MG/DL
CALCIUM SERPL-MCNC: 9.7 MG/DL
CHLORIDE SERPL-SCNC: 103 MMOL/L
CO2 SERPL-SCNC: 24 MMOL/L
CREAT SERPL-MCNC: 0.57 MG/DL
GLUCOSE SERPL-MCNC: 97 MG/DL
POTASSIUM SERPL-SCNC: 4.8 MMOL/L
PROT SERPL-MCNC: 7.3 G/DL
SODIUM SERPL-SCNC: 141 MMOL/L

## 2019-06-18 ENCOUNTER — APPOINTMENT (OUTPATIENT)
Dept: HEMATOLOGY ONCOLOGY | Facility: CLINIC | Age: 49
End: 2019-06-18
Payer: COMMERCIAL

## 2019-06-18 ENCOUNTER — LABORATORY RESULT (OUTPATIENT)
Age: 49
End: 2019-06-18

## 2019-06-18 ENCOUNTER — APPOINTMENT (OUTPATIENT)
Dept: INFUSION THERAPY | Facility: CLINIC | Age: 49
End: 2019-06-18
Payer: COMMERCIAL

## 2019-06-18 VITALS
HEART RATE: 125 BPM | HEIGHT: 66 IN | TEMPERATURE: 97.8 F | SYSTOLIC BLOOD PRESSURE: 137 MMHG | DIASTOLIC BLOOD PRESSURE: 89 MMHG | BODY MASS INDEX: 34.87 KG/M2 | WEIGHT: 217 LBS | RESPIRATION RATE: 18 BRPM

## 2019-06-18 DIAGNOSIS — Z92.89 PERSONAL HISTORY OF OTHER MEDICAL TREATMENT: ICD-10-CM

## 2019-06-18 DIAGNOSIS — Z97.5 PRESENCE OF (INTRAUTERINE) CONTRACEPTIVE DEVICE: ICD-10-CM

## 2019-06-18 DIAGNOSIS — R92.1 MAMMOGRAPHIC CALCIFICATION FOUND ON DIAGNOSTIC IMAGING OF BREAST: ICD-10-CM

## 2019-06-18 LAB
HCT VFR BLD CALC: 34.3 %
HGB BLD-MCNC: 11.3 G/DL
MCHC RBC-ENTMCNC: 30.9 PG
MCHC RBC-ENTMCNC: 33 GM/DL
MCV RBC AUTO: 93.6 FL
PLATELET # BLD AUTO: 197 K/UL
RBC # BLD: 3.66 M/UL
RBC # FLD: 15.9 %
WBC # FLD AUTO: 6.2 K/UL

## 2019-06-18 PROCEDURE — 36415 COLL VENOUS BLD VENIPUNCTURE: CPT

## 2019-06-18 PROCEDURE — 96367 TX/PROPH/DG ADDL SEQ IV INF: CPT

## 2019-06-18 PROCEDURE — 99213 OFFICE O/P EST LOW 20 MIN: CPT | Mod: 25

## 2019-06-18 PROCEDURE — 96415 CHEMO IV INFUSION ADDL HR: CPT

## 2019-06-18 PROCEDURE — 85025 COMPLETE CBC W/AUTO DIFF WBC: CPT

## 2019-06-18 PROCEDURE — 96413 CHEMO IV INFUSION 1 HR: CPT

## 2019-06-18 PROCEDURE — 96372 THER/PROPH/DIAG INJ SC/IM: CPT | Mod: 59

## 2019-06-18 RX ORDER — PROCHLORPERAZINE MALEATE 10 MG/1
10 TABLET ORAL EVERY 6 HOURS
Qty: 30 | Refills: 3 | Status: DISCONTINUED | COMMUNITY
Start: 2019-03-13 | End: 2019-06-18

## 2019-06-18 NOTE — ASSESSMENT
[FreeTextEntry1] : The patient is a 48 y.o. with an ER+, HER2-, right sided stage IIIC BC diagnosed age 47 - menopausal status uncertain due to IUD but labs seemed to be c/w menopause.  \par COLOR genetics negative, however patient did opt for b/l mastectomies with JASMYNE reconstruction. \par 3/26/19 - Started on adjuvant chemotherapy with DDACT. \par Today D1 C3 DD Taxol.  Patient overall tolerating as expected.\par 1. Onc - Patient to continue with Taxol - C#4/4 due on 7/2/19.  She will continue with icing to prevent neuropathies.  \par 2. GCSF induced bone pains - Continue with Claritin and Motrin PRN.  \par 3. Increased LFT's -  Avoid Tylenol as this was the likely cause of her LFT elevations.  \par After chemo patient will need XRT.  \par She had questions regarding monitoring/surveillance.  She was advised to make a f/u ~ 6 weeks after her last treatment for a port flush and a meeting with me to review the survivor care plan - her questions can be addressed in detail on this visit. \par \par Dr. Shwetha Hyde\par Dr. Kierra Campbell/ Dr. Deepak Alaniz\par Dr. Gabbie Faust\par Dr. Holyl Ken\par Dr. Venogopal Palla

## 2019-06-18 NOTE — HISTORY OF PRESENT ILLNESS
[Disease: _____________________] : Disease: [unfilled] [T: ___] : T[unfilled] [N: ___] : N[unfilled] [de-identified] : MARIANA JACKSON is a 48 y.o. with an ER+, HER2-, right sided stage IIIC breast cancer.  \par 11/5/18 - Presented at age 47(COLOR negative) with an abnormality on routine mammo - Right breast UOQ. Her menopausal status was uncertain as she had an IUD in, however her FSH, LH, and estradiol levels seemed to indicate she was postmenopausal. \par 1/3/19 - Right breast biopsy - Moderately diff IDC (6/9) and DCIS, ER 95%, ID 95%, HER2-\par 1/10/19 - MRI - 5.5 x 2.2cm area of irregular enhancement right outer breast.  No lymphadenopathy.  9mm lesion in the liver.\par 2/13/19 - B/L nipple sparing mastectomy with JASMYNE reconstruction - 0.5cm mod diff IDC with DCIS, cribriform, intermediate nuclear grade with focal necrosis.  Right SLN with a 1cm met and TUAN was present.  13 additional LN's were removed for a total of 14.  10/14 LN's +.  \par pT1a, pN3 = IIIC. \par PET/CT denied by insurance company. \par 2/23/19 - CT Chest - 3mm RUL nodular changes stable from 2014.  SUN calcified granuloma.  \par She had CT Angio A/P prior to surgery - 1.8cm posterior hepatic cyst in right lobe and area of concern on MRI was a left lobe cyst.  Small umbilical hernia. \par 2/15/19 - Bone scan - negative. \par 3/26/19 - Started on adjuvant chemotherapy with DDAC-T.  She had her 4th AC on 5/7/19.  \par 5/21/19 - Started DD Taxol.  \par   [de-identified] : mod diff IDC with DCIS [AJCC Stage: ____] : AJCC Stage: [unfilled] [de-identified] : ER 95%, MA 95%, HER2- (biopsy); ER >95%, MA > 95%, HER2 neg (LN) [de-identified] : 1/9/19 - Comprehensive Genetic Testing with COLOR - negative [de-identified] : Major complaint after Taxol  is bone pains that start nite of D2, then better by D6.  Taking Claritin.  \par Was taking Tylenol, however after C#1 LFT's were elevated so only took Advil./Motrin for C#2. \par Patient icing during chemo - so far no neuropathies. \par Else doing OK.  Denies any changes in family, medical, or social history since last visit of 5/28/19. \par

## 2019-06-18 NOTE — REASON FOR VISIT
[Follow-Up Visit] : a follow-up [Pre-Treatment Visit] : a pre-treatment [Spouse] : spouse [FreeTextEntry2] : Breast Cancer - D1 C3 Adjuvant DD Taxol (as part of DDACT)

## 2019-06-18 NOTE — REVIEW OF SYSTEMS
[Patient Intake Form Reviewed] : Patient intake form was reviewed [Constipation] : constipation [Hot Flashes] : hot flashes [Negative] : Allergic/Immunologic [Joint Pain] : joint pain [de-identified] : Has some headaches ~ D2 after chemo.  [de-identified] : Sleep problems with steroids [FreeTextEntry7] : Manageable with Colace.

## 2019-06-18 NOTE — PHYSICAL EXAM
[Obese] : obese [de-identified] : anicteric [Normal] : affect appropriate [de-identified] : no thrush or mucositis [de-identified] : S1S2 tachycardic, no obvious murmurs [de-identified] : no lymphadenopathy [de-identified] : no c/c/e; left CW port without s/s infection [de-identified] : no rashes

## 2019-07-02 ENCOUNTER — APPOINTMENT (OUTPATIENT)
Dept: HEMATOLOGY ONCOLOGY | Facility: CLINIC | Age: 49
End: 2019-07-02
Payer: COMMERCIAL

## 2019-07-02 ENCOUNTER — LABORATORY RESULT (OUTPATIENT)
Age: 49
End: 2019-07-02

## 2019-07-02 ENCOUNTER — APPOINTMENT (OUTPATIENT)
Dept: INFUSION THERAPY | Facility: CLINIC | Age: 49
End: 2019-07-02
Payer: COMMERCIAL

## 2019-07-02 VITALS
BODY MASS INDEX: 34.55 KG/M2 | TEMPERATURE: 97.3 F | DIASTOLIC BLOOD PRESSURE: 88 MMHG | HEIGHT: 66 IN | WEIGHT: 215 LBS | HEART RATE: 126 BPM | RESPIRATION RATE: 16 BRPM | SYSTOLIC BLOOD PRESSURE: 151 MMHG

## 2019-07-02 LAB
ALBUMIN SERPL ELPH-MCNC: 5 G/DL
ALP BLD-CCNC: 128 U/L
ALT SERPL-CCNC: 34 U/L
ANION GAP SERPL CALC-SCNC: 16 MMOL/L
AST SERPL-CCNC: 29 U/L
BILIRUB SERPL-MCNC: 0.7 MG/DL
BUN SERPL-MCNC: 13 MG/DL
CALCIUM SERPL-MCNC: 9.7 MG/DL
CHLORIDE SERPL-SCNC: 102 MMOL/L
CO2 SERPL-SCNC: 20 MMOL/L
CREAT SERPL-MCNC: 0.51 MG/DL
GLUCOSE SERPL-MCNC: 259 MG/DL
HCT VFR BLD CALC: 35.6 %
HGB BLD-MCNC: 11.7 G/DL
MCHC RBC-ENTMCNC: 31.5 PG
MCHC RBC-ENTMCNC: 33 GM/DL
MCV RBC AUTO: 95.4 FL
PLATELET # BLD AUTO: 223 K/UL
POTASSIUM SERPL-SCNC: 4.6 MMOL/L
PROT SERPL-MCNC: 7.5 G/DL
RBC # BLD: 3.73 M/UL
RBC # FLD: 14.4 %
SODIUM SERPL-SCNC: 138 MMOL/L
WBC # FLD AUTO: 5.4 K/UL

## 2019-07-02 PROCEDURE — 36415 COLL VENOUS BLD VENIPUNCTURE: CPT

## 2019-07-02 PROCEDURE — 99214 OFFICE O/P EST MOD 30 MIN: CPT | Mod: 25

## 2019-07-02 PROCEDURE — 96372 THER/PROPH/DIAG INJ SC/IM: CPT | Mod: 59

## 2019-07-02 PROCEDURE — 96367 TX/PROPH/DG ADDL SEQ IV INF: CPT

## 2019-07-02 PROCEDURE — 96415 CHEMO IV INFUSION ADDL HR: CPT

## 2019-07-02 PROCEDURE — 85025 COMPLETE CBC W/AUTO DIFF WBC: CPT

## 2019-07-02 PROCEDURE — 96413 CHEMO IV INFUSION 1 HR: CPT

## 2019-07-02 NOTE — HISTORY OF PRESENT ILLNESS
[Disease: _____________________] : Disease: [unfilled] [T: ___] : T[unfilled] [N: ___] : N[unfilled] [AJCC Stage: ____] : AJCC Stage: [unfilled] [de-identified] : 47 y/o perimenopausal woman presented with an abnormality in her right breast  on a screening mammogram . Biopsy confirmed cancer. Breast MRI showed 5.5 area on enhancement around biopsy site. Patient elected to have bilateral mastectomies. Genetic testing Color was negative.\par On 2/13/19 she underwent nipple sparing mastectomies, right sentinel LN biopsy was positive- followed by completion of axillary dissection and LYMPHA procedure and JASMYNE flap reconstruction.\par Pathology revealed 5 mm invasive ductal cancer mod differentiated , strongly ER/MA positive and HER 2 negative , metastatic to 10/14 axillary LNs ( largest metastasis 1 cm  with extranodal extension). \par \par CT chest, abdomen and pelvis and bone scan - negative for metastatic disease ( PET not covered by insurance).\par  \par Started adjuvant chemotherapy DD ACT on 3/26/19. \par \par Currently receiving dose dense Taxol  - today cycle 4/4 of TAxol.  \par \par  [de-identified] : moderately differentiated invasive ductal cancer  SBR 6/9  [de-identified] : ER 95 %  HI 95 %  HER 2 + 1 neg ( biopsy)  [de-identified] : Genetic testing Color negative\par \par Receptor studies on metastatic  lymph node  ER> 95 % FL > 95 %  HER 2 neg [de-identified] : Tolerated chemo very well. No neuropathy. Minor joint stiffness.

## 2019-07-02 NOTE — ASSESSMENT
[FreeTextEntry1] : 47 y/o perimenopausal with invasive ductal cancer of right breast T1a, N3 stage III C disease strongly ER/KY positive and HER 2 negative. \par S/p bilateral mastectomies , right  axillary  dissection with LYMPHA  procedure and JASMYNE reconstruction.\par \par S/p adjuvant chemotherapy with DD ACT- completed today 7/2/19. \par \par Tolerated extremely well. \par \par Will have adjuvant RT ( Dr Ken).. \par \par Discussed adjuvant hormonal therapy- tamoxifen versus Lupron plus exemestane.\par Briefly  reviewed side effects.\par She has high risk disease- total 10 years of adjuvant hormonal therapy recommended.\par Return visit in 4 weeks- will decide/ start hormonal therapy at that point.

## 2019-07-02 NOTE — PHYSICAL EXAM
[Obese] : obese [de-identified] : looks well  [Normal] : normal appearance, no rash, nodules, vesicles, ulcers, erythema [de-identified] : Mediport left upper chest  [de-identified] : no mucositis [de-identified] : s/p bilateral mastectomies nipple sparing with JASMYNE reconstruction [de-identified] : alopecia

## 2019-07-02 NOTE — REVIEW OF SYSTEMS
[Patient Intake Form Reviewed] : Patient intake form was reviewed [Insomnia] : insomnia [Negative] : Endocrine [Fatigue] : no fatigue [Joint Stiffness] : joint stiffness

## 2019-08-07 ENCOUNTER — APPOINTMENT (OUTPATIENT)
Dept: HEMATOLOGY ONCOLOGY | Facility: CLINIC | Age: 49
End: 2019-08-07
Payer: COMMERCIAL

## 2019-08-07 ENCOUNTER — APPOINTMENT (OUTPATIENT)
Dept: INFUSION THERAPY | Facility: CLINIC | Age: 49
End: 2019-08-07

## 2019-08-07 ENCOUNTER — APPOINTMENT (OUTPATIENT)
Dept: HEMATOLOGY ONCOLOGY | Facility: CLINIC | Age: 49
End: 2019-08-07

## 2019-08-07 ENCOUNTER — OUTPATIENT (OUTPATIENT)
Dept: OUTPATIENT SERVICES | Facility: HOSPITAL | Age: 49
LOS: 1 days | End: 2019-08-07
Payer: COMMERCIAL

## 2019-08-07 VITALS
TEMPERATURE: 98.7 F | HEART RATE: 101 BPM | DIASTOLIC BLOOD PRESSURE: 91 MMHG | WEIGHT: 210 LBS | BODY MASS INDEX: 33.75 KG/M2 | SYSTOLIC BLOOD PRESSURE: 138 MMHG | HEIGHT: 66 IN

## 2019-08-07 DIAGNOSIS — Z98.890 OTHER SPECIFIED POSTPROCEDURAL STATES: Chronic | ICD-10-CM

## 2019-08-07 DIAGNOSIS — Z90.13 ACQUIRED ABSENCE OF BILATERAL BREASTS AND NIPPLES: Chronic | ICD-10-CM

## 2019-08-07 DIAGNOSIS — R69 ILLNESS, UNSPECIFIED: ICD-10-CM

## 2019-08-07 DIAGNOSIS — Z79.899 OTHER LONG TERM (CURRENT) DRUG THERAPY: ICD-10-CM

## 2019-08-07 PROCEDURE — 99215 OFFICE O/P EST HI 40 MIN: CPT

## 2019-08-07 PROCEDURE — 96523 IRRIG DRUG DELIVERY DEVICE: CPT

## 2019-08-07 NOTE — ASSESSMENT
[FreeTextEntry1] : 47 y/o amy/ post menopausal with invasive ductal cancer of right breast T1a, N3 stage III C disease strongly ER/DE positive and HER 2 negative diagnosed in 2019. \par S/p bilateral mastectomies , right  axillary  dissection with LYMPHA  procedure and JASMYNE reconstruction.\par \par S/p adjuvant chemotherapy with DD ACT- completed  7/2/19. \par \par Started  adjuvant RT ( Dr Ken).. \par \par Discussed again adjuvant hormonal therapy- tamoxifen versus Lupron plus exemestane.\par \par She has high risk disease- total 10 years of adjuvant hormonal therapy recommended.\par \par She has  not have menses for few years - had IUD. Hormone levels MArch 2019 could be c/w postmenopausal state. Will repeat today.\par If again c/w postmenopausal state - she will start aromatase inhibitor ( Anastrozole). Discussed side effects.\par If not postmenopausal yet- recommend Lupron plus AI ( exemestane). \par \par \par Mediport will be removed in winter - at the time of plastic surgery ( nipple reconstruction).\par \par Exam in 3 months.

## 2019-08-07 NOTE — HISTORY OF PRESENT ILLNESS
[Disease: _____________________] : Disease: [unfilled] [T: ___] : T[unfilled] [AJCC Stage: ____] : AJCC Stage: [unfilled] [N: ___] : N[unfilled] [de-identified] : 49 y/o perimenopausal woman presented with an abnormality in her right breast  on a screening mammogram . Biopsy confirmed cancer. Breast MRI showed 5.5 area on enhancement around biopsy site. Patient elected to have bilateral mastectomies. Genetic testing Color was negative.\par On 2/13/19 she underwent nipple sparing mastectomies, right sentinel LN biopsy was positive- followed by completion of axillary dissection and LYMPHA procedure and JASMYNE flap reconstruction.\par Pathology revealed 5 mm invasive ductal cancer mod differentiated , strongly ER/MO positive and HER 2 negative , metastatic to 10/14 axillary LNs ( largest metastasis 1 cm  with extranodal extension). \par \par CT chest, abdomen and pelvis and bone scan - negative for metastatic disease ( PET not covered by insurance).\par  \par Started adjuvant chemotherapy DD ACT on 3/26/19. \par \par Completed chemotherapy 7/2/19.\par \par Started adjuvant  RT 8/6/19. \par \par Had no menses for few years before cancer diagnosis- had IUD  which was removed after cancer diagnosis.  Hormone levels ( estradiol, FSH,LH ) in MArch 2019 could be c/w postmenopausal state. \par \par  [de-identified] : moderately differentiated invasive ductal cancer  SBR 6/9  [de-identified] : Genetic testing Color negative\par \par Receptor studies on metastatic  lymph node  ER> 95 % [place] > 95 %  HER 2 neg [de-identified] : ER 95 %  [place] 95 %  HER 2 + 1 neg ( biopsy)  [de-identified] : Feels well. Energy is quite good. No neuropathy.

## 2019-08-07 NOTE — PHYSICAL EXAM
[Obese] : obese [Normal] : affect appropriate [de-identified] : looks great  [de-identified] : Mediport left upper chest  [de-identified] : no mucositis [de-identified] : alopecia [de-identified] : s/p bilateral mastectomies nipple sparing with JASMYNE reconstruction

## 2019-08-07 NOTE — REVIEW OF SYSTEMS
[Patient Intake Form Reviewed] : Patient intake form was reviewed [Insomnia] : insomnia [Joint Stiffness] : no joint stiffness [Fatigue] : no fatigue [Negative] : Musculoskeletal

## 2019-08-08 DIAGNOSIS — R69 ILLNESS, UNSPECIFIED: ICD-10-CM

## 2019-08-08 DIAGNOSIS — C50.911 MALIGNANT NEOPLASM OF UNSPECIFIED SITE OF RIGHT FEMALE BREAST: ICD-10-CM

## 2019-08-08 DIAGNOSIS — Z45.2 ENCOUNTER FOR ADJUSTMENT AND MANAGEMENT OF VASCULAR ACCESS DEVICE: ICD-10-CM

## 2019-08-12 DIAGNOSIS — Z45.2 ENCOUNTER FOR ADJUSTMENT AND MANAGEMENT OF VASCULAR ACCESS DEVICE: ICD-10-CM

## 2019-08-12 DIAGNOSIS — C50.911 MALIGNANT NEOPLASM OF UNSPECIFIED SITE OF RIGHT FEMALE BREAST: ICD-10-CM

## 2019-09-03 DIAGNOSIS — M89.8X9 OTHER SPECIFIED DISORDERS OF BONE, UNSPECIFIED SITE: ICD-10-CM

## 2019-09-03 LAB
BASOPHILS # BLD AUTO: 0.03 K/UL
BASOPHILS NFR BLD AUTO: 0.5 %
EOSINOPHIL # BLD AUTO: 0.14 K/UL
EOSINOPHIL NFR BLD AUTO: 2.4 %
ESTRADIOL SERPL-MCNC: <5 PG/ML
FSH SERPL-MCNC: 49.1 IU/L
HCT VFR BLD CALC: 39.1 %
HGB BLD-MCNC: 12.3 G/DL
IMM GRANULOCYTES NFR BLD AUTO: 0.2 %
LH SERPL-ACNC: 28.4 IU/L
LYMPHOCYTES # BLD AUTO: 0.76 K/UL
LYMPHOCYTES NFR BLD AUTO: 13.1 %
MAN DIFF?: NORMAL
MCHC RBC-ENTMCNC: 31.1 PG
MCHC RBC-ENTMCNC: 31.5 GM/DL
MCV RBC AUTO: 98.7 FL
MONOCYTES # BLD AUTO: 0.7 K/UL
MONOCYTES NFR BLD AUTO: 12 %
NEUTROPHILS # BLD AUTO: 4.17 K/UL
NEUTROPHILS NFR BLD AUTO: 71.8 %
PLATELET # BLD AUTO: 230 K/UL
RBC # BLD: 3.96 M/UL
RBC # FLD: 12.5 %
WBC # FLD AUTO: 5.81 K/UL

## 2019-09-03 NOTE — PATIENT PROFILE ADULT - NSPROSPIRITUALVALUESFT_GEN_A_NUR
From [place] ED.     Itz Eldridge is an 86 yo M with history of CAD s/p CABG in 1982, ALLY to SVG-> LAD and SVG to OM (10/28/18), ECHO on 4/23/19 with EF 60% and normal valvular and ventricular function and NM stress test negative for ischemia 4/2019, chronic atrial fibrillation now on Eliquis, HTN, HLD, mild to moderate mitral regurg who presents with dyspnea.     Trop elevated at 1.23 concerning for NSTEMI. CXR looks wet. BNP slightly elevated in the 500s. On 2L of O2. No Lasix yet. Dr. Kamaljit Hernandez of cardiology accepted him for cath for today. He is on ASA, Plavix and Eliquis but hasn't taken his meds today. The ED with touch base with cardiology to see if they want heparin gtt or therapeutic Lovenox since he missed his Eliquis.   
none

## 2019-09-05 PROBLEM — M89.8X9 BONE PAIN DUE TO G-CSF: Status: RESOLVED | Noted: 2019-06-18 | Resolved: 2019-09-05

## 2019-09-16 RX ORDER — DEXAMETHASONE 4 MG/1
4 TABLET ORAL
Qty: 36 | Refills: 0 | Status: DISCONTINUED | COMMUNITY
Start: 2019-03-13 | End: 2019-09-16

## 2019-09-18 ENCOUNTER — OUTPATIENT (OUTPATIENT)
Dept: OUTPATIENT SERVICES | Facility: HOSPITAL | Age: 49
LOS: 1 days | End: 2019-09-18
Payer: COMMERCIAL

## 2019-09-18 DIAGNOSIS — Z98.890 OTHER SPECIFIED POSTPROCEDURAL STATES: Chronic | ICD-10-CM

## 2019-09-18 DIAGNOSIS — C50.911 MALIGNANT NEOPLASM OF UNSPECIFIED SITE OF RIGHT FEMALE BREAST: ICD-10-CM

## 2019-09-18 DIAGNOSIS — R69 ILLNESS, UNSPECIFIED: ICD-10-CM

## 2019-09-18 DIAGNOSIS — Z90.13 ACQUIRED ABSENCE OF BILATERAL BREASTS AND NIPPLES: Chronic | ICD-10-CM

## 2019-09-18 PROCEDURE — 96523 IRRIG DRUG DELIVERY DEVICE: CPT

## 2019-09-19 DIAGNOSIS — Z45.2 ENCOUNTER FOR ADJUSTMENT AND MANAGEMENT OF VASCULAR ACCESS DEVICE: ICD-10-CM

## 2019-09-19 NOTE — ADDENDUM
[FreeTextEntry1] : Patient came with  to review.  1 hour spent 1:1 counseling. \par She started Arimidex ~ 2 weeks ago - so far no adverse reactions. \par Finished RT.

## 2019-09-19 NOTE — DISCUSSION/SUMMARY
[FreeTextEntry1] : Adjuvant chemotherapy with DDACT 3/26/19 - 7/2/19\par Doxorubicin (Adriamycin)                 60mg//2      D1 Q14 x 4\par Cyclophosphamide (Cytoxan)          600mg/m2   D1 Q14 x 4\par Followed by \par Paclitaxel (Taxol)                              175mg/m2  D1 Q14 x 4 \par There were no dose reductions or dose delays.  [FreeTextEntry2] : You will need to be on hormonal therapy for 10 years. Currently on Anastrazole (Arimidex) which is an aromatase inhibitor (AI).  \par Common side effects of AI's can include hot flashes, joint aches/pains, and hair thinning.  We may also see higher cholesterol levels on blood work and bone loss may be accelerated.   [de-identified] : No routine scans\par No routine tumor markers\par Need to have port flushed ~ every 4 - 6 weeks until removed.  [FreeTextEntry7] : [facility] [FreeTextEntry8] : Candace Gonzalez, RN, NP, AOCNP [FreeTextEntry9] : September 18. 2019

## 2019-09-25 ENCOUNTER — RX RENEWAL (OUTPATIENT)
Age: 49
End: 2019-09-25

## 2019-10-17 ENCOUNTER — APPOINTMENT (OUTPATIENT)
Dept: BREAST CENTER | Facility: CLINIC | Age: 49
End: 2019-10-17
Payer: COMMERCIAL

## 2019-10-17 ENCOUNTER — FORM ENCOUNTER (OUTPATIENT)
Age: 49
End: 2019-10-17

## 2019-10-17 VITALS
BODY MASS INDEX: 33.75 KG/M2 | DIASTOLIC BLOOD PRESSURE: 88 MMHG | HEIGHT: 66 IN | SYSTOLIC BLOOD PRESSURE: 143 MMHG | HEART RATE: 86 BPM | WEIGHT: 210 LBS

## 2019-10-17 PROCEDURE — 99214 OFFICE O/P EST MOD 30 MIN: CPT

## 2019-10-17 RX ORDER — LORATADINE 5 MG/5 ML
10 SOLUTION, ORAL ORAL
Refills: 0 | Status: DISCONTINUED | COMMUNITY
Start: 2019-04-30 | End: 2019-10-17

## 2019-10-17 RX ORDER — BIOTIN 10 MG
TABLET ORAL
Refills: 0 | Status: ACTIVE | COMMUNITY

## 2019-10-18 ENCOUNTER — APPOINTMENT (OUTPATIENT)
Dept: ULTRASOUND IMAGING | Facility: CLINIC | Age: 49
End: 2019-10-18
Payer: COMMERCIAL

## 2019-10-18 ENCOUNTER — OUTPATIENT (OUTPATIENT)
Dept: OUTPATIENT SERVICES | Facility: HOSPITAL | Age: 49
LOS: 1 days | End: 2019-10-18
Payer: COMMERCIAL

## 2019-10-18 DIAGNOSIS — Z98.890 OTHER SPECIFIED POSTPROCEDURAL STATES: Chronic | ICD-10-CM

## 2019-10-18 DIAGNOSIS — Z00.8 ENCOUNTER FOR OTHER GENERAL EXAMINATION: ICD-10-CM

## 2019-10-18 DIAGNOSIS — Z90.13 ACQUIRED ABSENCE OF BILATERAL BREASTS AND NIPPLES: Chronic | ICD-10-CM

## 2019-10-18 PROCEDURE — 93971 EXTREMITY STUDY: CPT

## 2019-10-18 PROCEDURE — 93971 EXTREMITY STUDY: CPT | Mod: 26,RT

## 2019-10-18 NOTE — PHYSICAL EXAM
[Examined in the supine and seated position] : examined in the supine and seated position [Symmetrical] : symmetrical [Grade 3] : Ptosis Grade 3 [Soft] : abdomen soft [Conjunctiva pink] : conjunctiva pink [Sclera nonicteric] : sclera nonicteric [No Supraclavicular Adenopathy] : no supraclavicular adenopathy [No Cervical Adenopathy] : no cervical adenopathy [Supple] : supple [No Thyromegaly] : no thyromegaly [Clear to Auscultation Bilat] : clear to auscultation bilaterally [Normal Sinus Rhythm] : normal sinus rhythm [No Rubs] : no pericardial rub [No Gallops] : no gallops [No dominant masses] : no dominant masses left breast [No dominant masses] : no dominant masses in right breast  [No Nipple Retraction] : no left nipple retraction [No Nipple Discharge] : no right nipple discharge [No Axillary Lymphadenopathy] : no left axillary lymphadenopathy [No Edema] : no edema [No Hepato-Splenomegaly] : no hepato-splenomegaly [No Rashes] : no rashes [No Ulceration] : no ulceration [de-identified] : Mastectomy incision inframmary fold.  JASMYNE flap warm, viable.   [de-identified] : S/P prophylactic mastectomy with JASMYNE flap.  Skin flaps and nipple areola complex are viable.

## 2019-10-18 NOTE — HISTORY OF PRESENT ILLNESS
[FreeTextEntry1] : 48 year old female i underwent bilateral nipple sparing mastectomies with JASMYNE flaps, right axillary sentinel node biopsy and completion axillary node dissection on 2/13/19.  She subsequently received adjuvant chemotherapy followed by radiation therapy to the right breast.   She presents for a surveillance breast examination.

## 2019-10-18 NOTE — DATA REVIEWED
[FreeTextEntry1] : Mammogram: ( [facility])  11/05/18   Findings:  Extensive post reduction scarring bilaterally.  Increasing architectural distortion in the right breast at 11 and 12:00. Grouped punctate calcifications at 11:00  right breast.  Oval asymmetry Left breast centrally seen only on the MLO view.\par \par Gene. Diagnostic Mammogram:  11/30/18   Findings:  Heterogenous calcifications right breast 11:00 which are suspicious for malignancy.  The oval asymmetry of the left breast resolves with compression imaging.  \par  \par Breast ultrasound:  11/30/18  Findings:   Bilateral architectural distortion.  No discrete masses. \par \par Right stereotactic biopsy ( 11:00 at [facility]):  1/3/19   PATHOLOGY: Moderately differentiated infiltrating ductal carcinoma., DCIS cribriform and micropapillary patterns.  ER-.95%, MI - 95%, HER2 - negative.\par \par MRI:  1/1019  Findings:  Irregular enhancement of the outer central right breast spanning up to 5.5 cm in AP dimension and 2.2 cm in the transverse dimension c/w areas of architectural distortion and calcifications on mammography.   N suspicious findings in the left breast.  No adenopathy.  9 mm T2 bright lesion in the liver.\par \par SURGICAL PATHOLOGY: 2/13/19\par Left mastectomy - no suspicious findings.\par Left retronipple tissue - negative.\par Right mastectomy - 5 mm invasive ductal carcinoma.  Extensive DCIS.\par R sentinel node  # 1 -  1 cm metastasis . Extracapsular  extension present. \par Nonsentinel node # 1 -  metastatic carcinoma with extracapsular extension. \par Right retronipple tissue - negative. Extracapsular extension present.\par Right axillary contents - Metastatic carcinoma to 8 of 11 nodes.  \par Right Internal mammary lymph node - negative.\par [id number]  Clinical stage IIIB

## 2019-10-24 ENCOUNTER — APPOINTMENT (OUTPATIENT)
Dept: PHYSICAL MEDICINE AND REHAB | Facility: CLINIC | Age: 49
End: 2019-10-24
Payer: COMMERCIAL

## 2019-10-24 VITALS
DIASTOLIC BLOOD PRESSURE: 93 MMHG | HEIGHT: 66 IN | BODY MASS INDEX: 32.14 KG/M2 | WEIGHT: 200 LBS | HEART RATE: 92 BPM | OXYGEN SATURATION: 96 % | SYSTOLIC BLOOD PRESSURE: 135 MMHG

## 2019-10-24 DIAGNOSIS — M79.89 PAIN IN RIGHT ARM: ICD-10-CM

## 2019-10-24 DIAGNOSIS — M79.601 PAIN IN RIGHT ARM: ICD-10-CM

## 2019-10-24 PROCEDURE — 99203 OFFICE O/P NEW LOW 30 MIN: CPT

## 2019-10-30 ENCOUNTER — APPOINTMENT (OUTPATIENT)
Dept: HEMATOLOGY ONCOLOGY | Facility: CLINIC | Age: 49
End: 2019-10-30
Payer: COMMERCIAL

## 2019-10-30 VITALS
DIASTOLIC BLOOD PRESSURE: 93 MMHG | RESPIRATION RATE: 16 BRPM | HEART RATE: 83 BPM | BODY MASS INDEX: 33.27 KG/M2 | SYSTOLIC BLOOD PRESSURE: 140 MMHG | HEIGHT: 66 IN | WEIGHT: 207 LBS

## 2019-10-30 PROBLEM — M79.601 PAIN AND SWELLING OF RIGHT UPPER EXTREMITY: Status: ACTIVE | Noted: 2019-10-17

## 2019-10-30 PROCEDURE — 99214 OFFICE O/P EST MOD 30 MIN: CPT

## 2019-10-30 NOTE — HISTORY OF PRESENT ILLNESS
[FreeTextEntry1] : 47 y/o perimenopausal woman presented with an abnormality in her right breast on a screening mammogram. Biopsy confirmed cancer.\par On 2/13/19 she underwent nipple sparing mastectomies, right sentinel LN biopsy was positive- followed by completion of axillary dissection and LYMPHA procedure and JASMYNE flap reconstruction.\par \par Started adjuvant chemotherapy DD ACT on 3/26/19. S/p chest wall adjuvant RT 8/6/19- 9/11/19. \par Started adjuvant Arimidex in September 2019. \par + Pain RUE, extending down to hand. No numbness, tingling, weakness.

## 2019-10-30 NOTE — ASSESSMENT
[FreeTextEntry1] : 49 y/o  post menopausal  woman with invasive ductal cancer of right breast T1a, N3 stage III C disease strongly ER/DE positive and HER 2 negative diagnosed in 2019. \par S/p bilateral mastectomies , right  axillary  dissection with LYMPHA  procedure and JASMYNE reconstruction.\par \par S/p adjuvant chemotherapy with DD ACT- completed  7/2/19. \par \par Started  adjuvant RT ( Dr Ken).. \par \par Started adjuvant hormonal therapy- Arimidex in September 2019. SO far tolerating OK.\par \par She has high risk disease- total 10 years of adjuvant hormonal therapy recommended.\par \par Baseline bone density- next year. \par \par \par Mediport will be removed in winter - at the time of plastic surgery ( nipple reconstruction).\par \par \par Discussed monitoring, answered questions.\par \par Exam in 6 months/ sooner PRN.

## 2019-10-30 NOTE — HISTORY OF PRESENT ILLNESS
[Disease: _____________________] : Disease: [unfilled] [T: ___] : T[unfilled] [N: ___] : N[unfilled] [AJCC Stage: ____] : AJCC Stage: [unfilled] [de-identified] : 49 y/o perimenopausal woman presented with an abnormality in her right breast  on a screening mammogram . Biopsy confirmed cancer. Breast MRI showed 5.5 area on enhancement around biopsy site. Patient elected to have bilateral mastectomies. Genetic testing Color was negative.\par On 2/13/19 she underwent nipple sparing mastectomies, right sentinel LN biopsy was positive- followed by completion of axillary dissection and LYMPHA procedure and JASMYNE flap reconstruction.\par Pathology revealed 5 mm invasive ductal cancer mod differentiated , strongly ER/GA positive and HER 2 negative , metastatic to 10/14 axillary LNs ( largest metastasis 1 cm  with extranodal extension). \par \par CT chest, abdomen and pelvis and bone scan - negative for metastatic disease ( PET not covered by insurance).\par  \par Started adjuvant chemotherapy DD ACT on 3/26/19. \par \par Completed chemotherapy 7/2/19.\par \par S/p chest wall adjuvant  RT 8/6/19- 9/11/19. \par \par Had no menses for few years before cancer diagnosis- had IUD  which was removed after cancer diagnosis.  Hormone levels ( estradiol, FSH,LH ) in MArch 2019 and in August 2019- c/w postmenopausal state. \par \par Started adjuvant Arimidex in September 2019. \par \par  [de-identified] : moderately differentiated invasive ductal cancer  SBR 6/9  [de-identified] : ER 95 %  ME 95 %  HER 2 + 1 neg ( biopsy)  [de-identified] : Genetic testing Color negative\par \par Receptor studies on metastatic  lymph node  ER> 95 % HI > 95 %  HER 2 neg [de-identified] : Feels well. Energy is quite good. No neuropathy. \par STarted Arimidex in September. Tolerating OK. Minimal stiffness joints. Hot flashes- had before arimidex. \par Noticed palpable cord in right upper extremity , evaluated by surgery- c/w axillary  web syndrome. She will start PT for that. ( Dr Mistry).\par Going back to work ( part time) in November.

## 2019-10-30 NOTE — ASSESSMENT
[FreeTextEntry1] : Patient would benefit from breast cancer physical therapy to increase range of motion of shoulder musculature, strengthen shoulder and scapular stabilizers. Stretch pec lateral chest wall muscles. She would also benefit from myofascial release of axillary cording and mobilization of breast incisions. \par

## 2019-10-30 NOTE — PHYSICAL EXAM
[FreeTextEntry1] : General Appearance: Well developed, well nourished, in no acute distress.\par Skin: Inspection of the skin reveals no redness. \par HEENT: The sclerae were anicteric and conjunctivae were pink and moist. \par Chest: Normal chest expansion. Breast incisions are clean, dry and intact. No seromas or neuromas are noted. \par Axilla: No masses are noted. No axillary cording is noted. \par Abdomen: Soft and non-tender with normal bowel sounds.\par Musculoskeletal: Normal range of motion of bilateral shoulders. Negative impingement tests.\par Extremities:+ cording in the right upper extremity \par Neurologic: The patient has normal muscle strength in bilateral upper and lower extremities. Cranial nerves are intact. No expressive or receptive aphasia. No dysarthria. Sensation is intact. Gait is steady.\par

## 2019-10-30 NOTE — PHYSICAL EXAM
[Obese] : obese [Normal] : affect appropriate [de-identified] : looks great  [de-identified] : Mediport left upper chest , thin  superficial cord palpable in RUE, no erythema no tenderness [de-identified] : s/p bilateral mastectomies nipple sparing with JASMYNE reconstruction [de-identified] : alopecia

## 2019-10-30 NOTE — REVIEW OF SYSTEMS
[Patient Intake Form Reviewed] : Patient intake form was reviewed [Fatigue] : no fatigue [Joint Stiffness] : no joint stiffness [Insomnia] : insomnia [Negative] : Endocrine

## 2019-11-05 NOTE — REVIEW OF SYSTEMS
detailed exam [Patient Intake Form Reviewed] : Patient intake form was reviewed [Fatigue] : fatigue [Negative] : Endocrine [FreeTextEntry2] : improving

## 2019-12-09 RX ORDER — SODIUM CHLORIDE 9 MG/ML
10 INJECTION INTRAMUSCULAR; INTRAVENOUS; SUBCUTANEOUS ONCE
Refills: 0 | Status: DISCONTINUED | OUTPATIENT
Start: 2019-12-11 | End: 2020-03-12

## 2019-12-11 ENCOUNTER — OUTPATIENT (OUTPATIENT)
Dept: OUTPATIENT SERVICES | Facility: HOSPITAL | Age: 49
LOS: 1 days | End: 2019-12-11
Payer: COMMERCIAL

## 2019-12-11 VITALS — DIASTOLIC BLOOD PRESSURE: 78 MMHG | HEART RATE: 88 BPM | SYSTOLIC BLOOD PRESSURE: 118 MMHG | TEMPERATURE: 98 F

## 2019-12-11 DIAGNOSIS — Z98.890 OTHER SPECIFIED POSTPROCEDURAL STATES: Chronic | ICD-10-CM

## 2019-12-11 DIAGNOSIS — Z90.13 ACQUIRED ABSENCE OF BILATERAL BREASTS AND NIPPLES: Chronic | ICD-10-CM

## 2019-12-11 DIAGNOSIS — R69 ILLNESS, UNSPECIFIED: ICD-10-CM

## 2019-12-11 PROCEDURE — 96523 IRRIG DRUG DELIVERY DEVICE: CPT

## 2019-12-12 DIAGNOSIS — R69 ILLNESS, UNSPECIFIED: ICD-10-CM

## 2019-12-12 DIAGNOSIS — Z45.2 ENCOUNTER FOR ADJUSTMENT AND MANAGEMENT OF VASCULAR ACCESS DEVICE: ICD-10-CM

## 2019-12-12 DIAGNOSIS — C50.911 MALIGNANT NEOPLASM OF UNSPECIFIED SITE OF RIGHT FEMALE BREAST: ICD-10-CM

## 2020-01-29 ENCOUNTER — OUTPATIENT (OUTPATIENT)
Dept: OUTPATIENT SERVICES | Facility: HOSPITAL | Age: 50
LOS: 1 days | End: 2020-01-29
Payer: COMMERCIAL

## 2020-01-29 VITALS — TEMPERATURE: 99 F | SYSTOLIC BLOOD PRESSURE: 121 MMHG | HEART RATE: 99 BPM | DIASTOLIC BLOOD PRESSURE: 81 MMHG

## 2020-01-29 DIAGNOSIS — R69 ILLNESS, UNSPECIFIED: ICD-10-CM

## 2020-01-29 DIAGNOSIS — Z90.13 ACQUIRED ABSENCE OF BILATERAL BREASTS AND NIPPLES: Chronic | ICD-10-CM

## 2020-01-29 DIAGNOSIS — C50.911 MALIGNANT NEOPLASM OF UNSPECIFIED SITE OF RIGHT FEMALE BREAST: ICD-10-CM

## 2020-01-29 DIAGNOSIS — Z98.890 OTHER SPECIFIED POSTPROCEDURAL STATES: Chronic | ICD-10-CM

## 2020-01-29 PROCEDURE — 96523 IRRIG DRUG DELIVERY DEVICE: CPT

## 2020-01-29 RX ORDER — SODIUM CHLORIDE 9 MG/ML
10 INJECTION INTRAMUSCULAR; INTRAVENOUS; SUBCUTANEOUS ONCE
Refills: 0 | Status: COMPLETED | OUTPATIENT
Start: 2020-01-29 | End: 2020-01-29

## 2020-01-29 RX ADMIN — SODIUM CHLORIDE 10 MILLILITER(S): 9 INJECTION INTRAMUSCULAR; INTRAVENOUS; SUBCUTANEOUS at 16:15

## 2020-01-29 RX ADMIN — Medication 500 UNIT(S): at 16:15

## 2020-01-30 DIAGNOSIS — Z45.2 ENCOUNTER FOR ADJUSTMENT AND MANAGEMENT OF VASCULAR ACCESS DEVICE: ICD-10-CM

## 2020-03-13 ENCOUNTER — OUTPATIENT (OUTPATIENT)
Dept: OUTPATIENT SERVICES | Facility: HOSPITAL | Age: 50
LOS: 1 days | End: 2020-03-13
Payer: COMMERCIAL

## 2020-03-13 VITALS — HEART RATE: 108 BPM | DIASTOLIC BLOOD PRESSURE: 98 MMHG | TEMPERATURE: 98 F | SYSTOLIC BLOOD PRESSURE: 133 MMHG

## 2020-03-13 DIAGNOSIS — C50.911 MALIGNANT NEOPLASM OF UNSPECIFIED SITE OF RIGHT FEMALE BREAST: ICD-10-CM

## 2020-03-13 DIAGNOSIS — Z90.13 ACQUIRED ABSENCE OF BILATERAL BREASTS AND NIPPLES: Chronic | ICD-10-CM

## 2020-03-13 DIAGNOSIS — R69 ILLNESS, UNSPECIFIED: ICD-10-CM

## 2020-03-13 DIAGNOSIS — Z98.890 OTHER SPECIFIED POSTPROCEDURAL STATES: Chronic | ICD-10-CM

## 2020-03-13 PROCEDURE — 96523 IRRIG DRUG DELIVERY DEVICE: CPT

## 2020-03-13 RX ORDER — SODIUM CHLORIDE 9 MG/ML
10 INJECTION INTRAMUSCULAR; INTRAVENOUS; SUBCUTANEOUS ONCE
Refills: 0 | Status: COMPLETED | OUTPATIENT
Start: 2020-03-13 | End: 2020-03-13

## 2020-03-13 RX ADMIN — SODIUM CHLORIDE 10 MILLILITER(S): 9 INJECTION INTRAMUSCULAR; INTRAVENOUS; SUBCUTANEOUS at 12:15

## 2020-03-13 RX ADMIN — Medication 500 UNIT(S): at 12:17

## 2020-03-16 DIAGNOSIS — Z45.2 ENCOUNTER FOR ADJUSTMENT AND MANAGEMENT OF VASCULAR ACCESS DEVICE: ICD-10-CM

## 2020-03-17 ENCOUNTER — OUTPATIENT (OUTPATIENT)
Dept: OUTPATIENT SERVICES | Facility: HOSPITAL | Age: 50
LOS: 1 days | Discharge: HOME | End: 2020-03-17

## 2020-03-17 DIAGNOSIS — Z98.890 OTHER SPECIFIED POSTPROCEDURAL STATES: Chronic | ICD-10-CM

## 2020-03-17 DIAGNOSIS — C50.911 MALIGNANT NEOPLASM OF UNSPECIFIED SITE OF RIGHT FEMALE BREAST: ICD-10-CM

## 2020-03-17 DIAGNOSIS — Z90.13 ACQUIRED ABSENCE OF BILATERAL BREASTS AND NIPPLES: Chronic | ICD-10-CM

## 2020-03-18 NOTE — ASU PREOP CHECKLIST - NSWEIGHTCALCTOOLDRUG_GEN_A_CORE
· At baseline  · Lethargic, pt states she feels tired  · Restorative care for ADL and IADL  · PT/OT  used

## 2020-03-19 ENCOUNTER — RESULT REVIEW (OUTPATIENT)
Age: 50
End: 2020-03-19

## 2020-03-19 ENCOUNTER — APPOINTMENT (OUTPATIENT)
Dept: HEMATOLOGY ONCOLOGY | Facility: CLINIC | Age: 50
End: 2020-03-19
Payer: COMMERCIAL

## 2020-03-19 VITALS
HEART RATE: 92 BPM | RESPIRATION RATE: 16 BRPM | HEIGHT: 66 IN | DIASTOLIC BLOOD PRESSURE: 88 MMHG | TEMPERATURE: 98.2 F | SYSTOLIC BLOOD PRESSURE: 138 MMHG

## 2020-03-19 LAB
ALBUMIN SERPL ELPH-MCNC: 4.8 G/DL — SIGNIFICANT CHANGE UP (ref 3.3–5)
ALP SERPL-CCNC: 120 U/L — SIGNIFICANT CHANGE UP (ref 40–120)
ALT FLD-CCNC: 18 U/L — SIGNIFICANT CHANGE UP (ref 10–45)
ANION GAP SERPL CALC-SCNC: 13 MMOL/L — SIGNIFICANT CHANGE UP (ref 5–17)
AST SERPL-CCNC: 24 U/L — SIGNIFICANT CHANGE UP (ref 10–40)
BASOPHILS # BLD AUTO: 0.02 K/UL — SIGNIFICANT CHANGE UP (ref 0–0.2)
BASOPHILS NFR BLD AUTO: 0.4 % — SIGNIFICANT CHANGE UP (ref 0–2)
BILIRUB SERPL-MCNC: 0.5 MG/DL — SIGNIFICANT CHANGE UP (ref 0.2–1.2)
BUN SERPL-MCNC: 12 MG/DL — SIGNIFICANT CHANGE UP (ref 7–23)
CALCIUM SERPL-MCNC: 9.8 MG/DL — SIGNIFICANT CHANGE UP (ref 8.4–10.5)
CHLORIDE SERPL-SCNC: 100 MMOL/L — SIGNIFICANT CHANGE UP (ref 96–108)
CO2 SERPL-SCNC: 27 MMOL/L — SIGNIFICANT CHANGE UP (ref 22–31)
CREAT SERPL-MCNC: 0.56 MG/DL — SIGNIFICANT CHANGE UP (ref 0.5–1.3)
EOSINOPHIL # BLD AUTO: 0.13 K/UL — SIGNIFICANT CHANGE UP (ref 0–0.5)
EOSINOPHIL NFR BLD AUTO: 2.5 % — SIGNIFICANT CHANGE UP (ref 0–6)
GLUCOSE SERPL-MCNC: 131 MG/DL — HIGH (ref 70–99)
HCT VFR BLD CALC: 39.2 % — SIGNIFICANT CHANGE UP (ref 34.5–45)
HGB BLD-MCNC: 13.2 G/DL — SIGNIFICANT CHANGE UP (ref 11.5–15.5)
IMM GRANULOCYTES NFR BLD AUTO: 0.4 % — SIGNIFICANT CHANGE UP (ref 0–1.5)
LYMPHOCYTES # BLD AUTO: 0.78 K/UL — LOW (ref 1–3.3)
LYMPHOCYTES # BLD AUTO: 15 % — SIGNIFICANT CHANGE UP (ref 13–44)
MCHC RBC-ENTMCNC: 30.3 PG — SIGNIFICANT CHANGE UP (ref 27–34)
MCHC RBC-ENTMCNC: 33.7 GM/DL — SIGNIFICANT CHANGE UP (ref 32–36)
MCV RBC AUTO: 89.9 FL — SIGNIFICANT CHANGE UP (ref 80–100)
MONOCYTES # BLD AUTO: 0.46 K/UL — SIGNIFICANT CHANGE UP (ref 0–0.9)
MONOCYTES NFR BLD AUTO: 8.9 % — SIGNIFICANT CHANGE UP (ref 2–14)
NEUTROPHILS # BLD AUTO: 3.78 K/UL — SIGNIFICANT CHANGE UP (ref 1.8–7.4)
NEUTROPHILS NFR BLD AUTO: 72.8 % — SIGNIFICANT CHANGE UP (ref 43–77)
NRBC # BLD: 0 /100 WBCS — SIGNIFICANT CHANGE UP (ref 0–0)
PLATELET # BLD AUTO: 203 K/UL — SIGNIFICANT CHANGE UP (ref 150–400)
POTASSIUM SERPL-MCNC: 4.3 MMOL/L — SIGNIFICANT CHANGE UP (ref 3.5–5.3)
POTASSIUM SERPL-SCNC: 4.3 MMOL/L — SIGNIFICANT CHANGE UP (ref 3.5–5.3)
PROT SERPL-MCNC: 7.4 G/DL — SIGNIFICANT CHANGE UP (ref 6–8.3)
RBC # BLD: 4.36 M/UL — SIGNIFICANT CHANGE UP (ref 3.8–5.2)
RBC # FLD: 12 % — SIGNIFICANT CHANGE UP (ref 10.3–14.5)
SODIUM SERPL-SCNC: 140 MMOL/L — SIGNIFICANT CHANGE UP (ref 135–145)
WBC # BLD: 5.19 K/UL — SIGNIFICANT CHANGE UP (ref 3.8–10.5)
WBC # FLD AUTO: 5.19 K/UL — SIGNIFICANT CHANGE UP (ref 3.8–10.5)

## 2020-03-19 PROCEDURE — 99499A: CUSTOM | Mod: NC

## 2020-04-13 ENCOUNTER — OUTPATIENT (OUTPATIENT)
Dept: OUTPATIENT SERVICES | Facility: HOSPITAL | Age: 50
LOS: 1 days | Discharge: HOME | End: 2020-04-13

## 2020-04-13 DIAGNOSIS — Z98.890 OTHER SPECIFIED POSTPROCEDURAL STATES: Chronic | ICD-10-CM

## 2020-04-13 DIAGNOSIS — Z90.13 ACQUIRED ABSENCE OF BILATERAL BREASTS AND NIPPLES: Chronic | ICD-10-CM

## 2020-04-13 DIAGNOSIS — C50.911 MALIGNANT NEOPLASM OF UNSPECIFIED SITE OF RIGHT FEMALE BREAST: ICD-10-CM

## 2020-04-20 ENCOUNTER — APPOINTMENT (OUTPATIENT)
Dept: HEMATOLOGY ONCOLOGY | Facility: CLINIC | Age: 50
End: 2020-04-20
Payer: COMMERCIAL

## 2020-04-20 ENCOUNTER — APPOINTMENT (OUTPATIENT)
Dept: INFUSION THERAPY | Facility: CLINIC | Age: 50
End: 2020-04-20

## 2020-04-20 ENCOUNTER — APPOINTMENT (OUTPATIENT)
Dept: BREAST CENTER | Facility: CLINIC | Age: 50
End: 2020-04-20

## 2020-04-20 VITALS — SYSTOLIC BLOOD PRESSURE: 133 MMHG | DIASTOLIC BLOOD PRESSURE: 91 MMHG | HEART RATE: 97 BPM | TEMPERATURE: 97.8 F

## 2020-04-20 PROCEDURE — 99214 OFFICE O/P EST MOD 30 MIN: CPT

## 2020-04-20 NOTE — REVIEW OF SYSTEMS
[Patient Intake Form Reviewed] : Patient intake form was reviewed [Insomnia] : insomnia [Negative] : Endocrine [Fatigue] : no fatigue [Joint Stiffness] : no joint stiffness

## 2020-04-20 NOTE — HISTORY OF PRESENT ILLNESS
[T: ___] : T[unfilled] [Disease: _____________________] : Disease: [unfilled] [AJCC Stage: ____] : AJCC Stage: [unfilled] [N: ___] : N[unfilled] [de-identified] : Presented in 2018 at age 48  perimenopausal  with an abnormality in  right breast  on a screening mammogram . Biopsy confirmed cancer. Breast MRI showed 5.5 area on enhancement around biopsy site. Patient elected to have bilateral mastectomies. Genetic testing Color was negative.\par On 2/13/19 she underwent nipple sparing mastectomies, right sentinel LN biopsy was positive- followed by completion of axillary dissection and LYMPHA procedure and JASMYNE flap reconstruction.\par Pathology revealed 5 mm invasive ductal cancer mod differentiated , strongly ER/OR positive and HER 2 negative , metastatic to 10/14 axillary LNs ( largest metastasis 1 cm  with extranodal extension). \par \par CT chest, abdomen and pelvis and bone scan - negative for metastatic disease ( PET not covered by insurance).\par  \par Adjuvant chemotherapy DD ACT 3/26/19- 7/2/19.\par \par S/p chest wall adjuvant  RT 8/6/19- 9/11/19. \par \par Had no menses for few years before cancer diagnosis- had IUD  which was removed after cancer diagnosis.  Hormone levels ( estradiol, FSH,LH ) in MArch 2019 and in August 2019- c/w postmenopausal state. \par \par Started adjuvant Arimidex in September 2019. \par \par  [de-identified] : Genetic testing Color negative\par \par Receptor studies on metastatic  lymph node  ER> 95 % MI > 95 %  HER 2 neg [FreeTextEntry1] : adjuvant Arimidex started Sept 2019 [de-identified] : ER 95 %  [place] 95 %  HER 2 + 1 neg ( biopsy)  [de-identified] : moderately differentiated invasive ductal cancer  SBR 6/9  [de-identified] : Feels OK . Energy is quite good. No neuropathy. \par STarted Arimidex in September. Tolerating OK. Joint stiffness tolerable. Hot flashes- had before arimidex. \par Completed PT for cord in RUE- improved. \par Planned for completion of breast reconstruction surgery but due to COVID - elective surgeries are on hold.

## 2020-04-20 NOTE — ASSESSMENT
[FreeTextEntry1] : 50 y/o  post menopausal  woman with invasive ductal cancer of right breast T1a, N3 stage III C disease strongly ER/OR positive and HER 2 negative diagnosed in 2019. \par S/p bilateral mastectomies , right  axillary  dissection with LYMPHA  procedure and JASMYNE reconstruction.\par \par S/p adjuvant chemotherapy with DD ACT- completed  7/2/19. \par \par s/p   adjuvant RT ( Dr Ken).. \par \par Started adjuvant hormonal therapy- Arimidex in September 2019. SO far tolerating OK.\par \par She has high risk disease- total 10 years of adjuvant hormonal therapy recommended.\par \par Baseline bone density- get next year. \par Takes Calcium with vit D\par \par Still has mediport- to be removed when feasible , for now she continues port flushes every 8 weeks. \par \par \par Exam in 6 months.  Get vit D level next visit. Discussed monitoring, answered questions.\par \par

## 2020-04-26 ENCOUNTER — MESSAGE (OUTPATIENT)
Age: 50
End: 2020-04-26

## 2020-05-05 LAB
ALBUMIN SERPL ELPH-MCNC: 4.8 G/DL
ALP BLD-CCNC: 134 U/L
ALT SERPL-CCNC: 18 U/L
ANION GAP SERPL CALC-SCNC: 15 MMOL/L
APTT BLD: 30.2 SEC
AST SERPL-CCNC: 22 U/L
BASOPHILS # BLD AUTO: 0.04 K/UL
BASOPHILS NFR BLD AUTO: 0.8 %
BILIRUB SERPL-MCNC: 0.6 MG/DL
BUN SERPL-MCNC: 15 MG/DL
CALCIUM SERPL-MCNC: 9.6 MG/DL
CHLORIDE SERPL-SCNC: 99 MMOL/L
CO2 SERPL-SCNC: 24 MMOL/L
CREAT SERPL-MCNC: 0.59 MG/DL
EOSINOPHIL # BLD AUTO: 0.08 K/UL
EOSINOPHIL NFR BLD AUTO: 1.6 %
GLUCOSE SERPL-MCNC: 73 MG/DL
HCT VFR BLD CALC: 39.4 %
HGB BLD-MCNC: 12.6 G/DL
IMM GRANULOCYTES NFR BLD AUTO: 0.6 %
INR PPP: 0.98 RATIO
LYMPHOCYTES # BLD AUTO: 0.85 K/UL
LYMPHOCYTES NFR BLD AUTO: 17.1 %
MAN DIFF?: NORMAL
MCHC RBC-ENTMCNC: 29.8 PG
MCHC RBC-ENTMCNC: 32 GM/DL
MCV RBC AUTO: 93.1 FL
MONOCYTES # BLD AUTO: 0.58 K/UL
MONOCYTES NFR BLD AUTO: 11.7 %
NEUTROPHILS # BLD AUTO: 3.39 K/UL
NEUTROPHILS NFR BLD AUTO: 68.2 %
PLATELET # BLD AUTO: 224 K/UL
POTASSIUM SERPL-SCNC: 4.2 MMOL/L
PROT SERPL-MCNC: 7.2 G/DL
PT BLD: 11.2 SEC
RBC # BLD: 4.23 M/UL
RBC # FLD: 12.7 %
SODIUM SERPL-SCNC: 137 MMOL/L
WBC # FLD AUTO: 4.97 K/UL

## 2020-05-15 ENCOUNTER — OUTPATIENT (OUTPATIENT)
Dept: OUTPATIENT SERVICES | Facility: HOSPITAL | Age: 50
LOS: 1 days | End: 2020-05-15

## 2020-05-15 ENCOUNTER — APPOINTMENT (OUTPATIENT)
Dept: INTERVENTIONAL RADIOLOGY/VASCULAR | Facility: CLINIC | Age: 50
End: 2020-05-15
Payer: COMMERCIAL

## 2020-05-15 DIAGNOSIS — Z98.890 OTHER SPECIFIED POSTPROCEDURAL STATES: Chronic | ICD-10-CM

## 2020-05-15 DIAGNOSIS — Z00.8 ENCOUNTER FOR OTHER GENERAL EXAMINATION: ICD-10-CM

## 2020-05-15 DIAGNOSIS — Z90.13 ACQUIRED ABSENCE OF BILATERAL BREASTS AND NIPPLES: Chronic | ICD-10-CM

## 2020-05-15 PROCEDURE — 36590 REMOVAL TUNNELED CV CATH: CPT

## 2020-05-15 PROCEDURE — 77001 FLUOROGUIDE FOR VEIN DEVICE: CPT | Mod: 26

## 2020-07-06 ENCOUNTER — APPOINTMENT (OUTPATIENT)
Dept: BREAST CENTER | Facility: CLINIC | Age: 50
End: 2020-07-06
Payer: COMMERCIAL

## 2020-07-06 VITALS
HEIGHT: 66 IN | SYSTOLIC BLOOD PRESSURE: 135 MMHG | BODY MASS INDEX: 32.14 KG/M2 | HEART RATE: 91 BPM | DIASTOLIC BLOOD PRESSURE: 85 MMHG | WEIGHT: 200 LBS

## 2020-07-06 DIAGNOSIS — Z85.3 PERSONAL HISTORY OF MALIGNANT NEOPLASM OF BREAST: ICD-10-CM

## 2020-07-06 DIAGNOSIS — Z92.3 PERSONAL HISTORY OF IRRADIATION: ICD-10-CM

## 2020-07-06 PROCEDURE — 99214 OFFICE O/P EST MOD 30 MIN: CPT

## 2020-07-06 RX ORDER — B-COMPLEX WITH VITAMIN C
TABLET ORAL
Refills: 0 | Status: DISCONTINUED | COMMUNITY
End: 2020-07-06

## 2020-07-06 NOTE — PHYSICAL EXAM
[Sclera nonicteric] : sclera nonicteric [No Supraclavicular Adenopathy] : no supraclavicular adenopathy [Conjunctiva pink] : conjunctiva pink [Supple] : supple [No Cervical Adenopathy] : no cervical adenopathy [No Thyromegaly] : no thyromegaly [No Gallops] : no gallops [Clear to Auscultation Bilat] : clear to auscultation bilaterally [Normal Sinus Rhythm] : normal sinus rhythm [Symmetrical] : symmetrical [No Rubs] : no pericardial rub [Examined in the supine and seated position] : examined in the supine and seated position [Grade 3] : Ptosis Grade 3 [No dominant masses] : no dominant masses in right breast  [No dominant masses] : no dominant masses left breast [No Nipple Retraction] : no right nipple retraction [No Nipple Discharge] : no left nipple discharge [No Hepato-Splenomegaly] : no hepato-splenomegaly [No Axillary Lymphadenopathy] : no left axillary lymphadenopathy [Soft] : abdomen soft [No Edema] : no edema [No Rashes] : no rashes [No Ulceration] : no ulceration [de-identified] : Mastectomy incision inframmary fold.  JASMYNE flap warm, viable.   [de-identified] : S/P prophylactic mastectomy with JASMYNE flap.  Skin flaps and nipple areola complex are viable.

## 2020-07-06 NOTE — DATA REVIEWED
[FreeTextEntry1] : Mammogram: ( [facility])  11/05/18   Findings:  Extensive post reduction scarring bilaterally.  Increasing architectural distortion in the right breast at 11 and 12:00. Grouped punctate calcifications at 11:00  right breast.  Oval asymmetry Left breast centrally seen only on the MLO view.\par \par Gene. Diagnostic Mammogram:  11/30/18   Findings:  Heterogenous calcifications right breast 11:00 which are suspicious for malignancy.  The oval asymmetry of the left breast resolves with compression imaging.  \par  \par Breast ultrasound:  11/30/18  Findings:   Bilateral architectural distortion.  No discrete masses. \par \par Right stereotactic biopsy ( 11:00 at [facility]):  1/3/19   PATHOLOGY: Moderately differentiated infiltrating ductal carcinoma., DCIS cribriform and micropapillary patterns.  ER-.95%, ND - 95%, HER2 - negative.\par \par MRI:  1/1019  Findings:  Irregular enhancement of the outer central right breast spanning up to 5.5 cm in AP dimension and 2.2 cm in the transverse dimension c/w areas of architectural distortion and calcifications on mammography.   N suspicious findings in the left breast.  No adenopathy.  9 mm T2 bright lesion in the liver.\par \par SURGICAL PATHOLOGY: 2/13/19\par Left mastectomy - no suspicious findings.\par Left retronipple tissue - negative.\par Right mastectomy - 5 mm invasive ductal carcinoma.  Extensive DCIS.\par R sentinel node  # 1 -  1 cm metastasis . Extracapsular  extension present. \par Nonsentinel node # 1 -  metastatic carcinoma with extracapsular extension. \par Right retronipple tissue - negative. Extracapsular extension present.\par Right axillary contents - Metastatic carcinoma to 8 of 11 nodes.  \par Right Internal mammary lymph node - negative.\par [id number]  Clinical stage IIIB

## 2020-07-06 NOTE — PAST MEDICAL HISTORY
[Menarche Age ____] : age at menarche was [unfilled] [Total Preg ___] : G[unfilled] [Amenorrhea] : amenorrhea [Age At Live Birth ___] : Age at live birth: [unfilled] [Live Births ___] : P[unfilled]  [FreeTextEntry7] : Mirena IUD [FreeTextEntry1] : IUD

## 2020-07-06 NOTE — HISTORY OF PRESENT ILLNESS
[FreeTextEntry1] : 49 year old female i underwent bilateral nipple sparing mastectomies with JASMYNE flaps, right axillary sentinel node biopsy and completion axillary node dissection on 2/13/19.  She subsequently received adjuvant chemotherapy followed by radiation therapy to the right breast for stage IIIC ( T1a, N3, ER+, CO +, HER2 negative).   She presents for a surveillance breast examination.

## 2020-10-07 NOTE — PATIENT PROFILE ADULT - FALL HARM RISK
Quality 130: Documentation Of Current Medications In The Medical Record: Current Medications Documented Quality 402: Tobacco Use And Help With Quitting Among Adolescents: Patient screened for tobacco and never smoked Detail Level: Detailed other

## 2020-10-26 ENCOUNTER — OUTPATIENT (OUTPATIENT)
Dept: OUTPATIENT SERVICES | Facility: HOSPITAL | Age: 50
LOS: 1 days | Discharge: HOME | End: 2020-10-26

## 2020-10-26 DIAGNOSIS — C50.911 MALIGNANT NEOPLASM OF UNSPECIFIED SITE OF RIGHT FEMALE BREAST: ICD-10-CM

## 2020-10-26 DIAGNOSIS — Z98.890 OTHER SPECIFIED POSTPROCEDURAL STATES: Chronic | ICD-10-CM

## 2020-10-26 DIAGNOSIS — Z90.13 ACQUIRED ABSENCE OF BILATERAL BREASTS AND NIPPLES: Chronic | ICD-10-CM

## 2020-10-27 ENCOUNTER — APPOINTMENT (OUTPATIENT)
Dept: HEMATOLOGY ONCOLOGY | Facility: CLINIC | Age: 50
End: 2020-10-27
Payer: COMMERCIAL

## 2020-10-27 ENCOUNTER — RESULT REVIEW (OUTPATIENT)
Age: 50
End: 2020-10-27

## 2020-10-27 VITALS — DIASTOLIC BLOOD PRESSURE: 91 MMHG | TEMPERATURE: 98 F | HEART RATE: 112 BPM | SYSTOLIC BLOOD PRESSURE: 132 MMHG

## 2020-10-27 LAB
SARS-COV-2 IGG SERPL IA-ACNC: 0 INDEX
SARS-COV-2 IGG SERPL QL IA: NEGATIVE

## 2020-10-27 PROCEDURE — 99214 OFFICE O/P EST MOD 30 MIN: CPT

## 2020-10-27 RX ORDER — UBIDECARENONE/VIT E ACET 100MG-5
CAPSULE ORAL
Refills: 0 | Status: ACTIVE | COMMUNITY

## 2020-10-27 NOTE — PHYSICAL EXAM
[Normal] : affect appropriate [de-identified] : looks well  [de-identified] : Mediport removed  [de-identified] : s/p bilateral mastectomies with JASMYNE reconstruction, no palpable masses, no adenopathy

## 2020-10-27 NOTE — HISTORY OF PRESENT ILLNESS
[Disease: _____________________] : Disease: [unfilled] [T: ___] : T[unfilled] [N: ___] : N[unfilled] [AJCC Stage: ____] : AJCC Stage: [unfilled] [de-identified] : Presented in 2018 at age 48  perimenopausal  with an abnormality in  right breast  on a screening mammogram . Biopsy confirmed cancer. Breast MRI showed 5.5 area on enhancement around biopsy site. Patient elected to have bilateral mastectomies. Genetic testing Color was negative.\par On 2/13/19 she underwent nipple sparing mastectomies, right sentinel LN biopsy was positive- followed by completion of axillary dissection and LYMPHA procedure and JASMYNE flap reconstruction.\par Pathology revealed 5 mm invasive ductal cancer mod differentiated , strongly ER/WV positive and HER 2 negative , metastatic to 10/14 axillary LNs ( largest metastasis 1 cm  with extranodal extension). \par \par CT chest, abdomen and pelvis and bone scan - negative for metastatic disease ( PET not covered by insurance).\par  \par Adjuvant chemotherapy DD ACT 3/26/19- 7/2/19.\par \par S/p chest wall adjuvant  RT 8/6/19- 9/11/19. \par \par Had no menses for few years before cancer diagnosis- had IUD  which was removed after cancer diagnosis.  Hormone levels ( estradiol, FSH,LH ) in MArch 2019 and in August 2019- c/w postmenopausal state. \par \par Started adjuvant Arimidex in September 2019. \par \par  [de-identified] : moderately differentiated invasive ductal cancer  SBR 6/9  [de-identified] : ER 95 %  OR 95 %  HER 2 + 1 neg ( biopsy)  [de-identified] : Genetic testing Color negative\par \par Receptor studies on metastatic  lymph node  ER> 95 % OR > 95 %  HER 2 neg [FreeTextEntry1] : adjuvant Arimidex started Sept 2019 [de-identified] : Feels OK . Energy is quite good. No neuropathy.  takes Calcium with vit D \par Tolerating Arimidex OK.  Joint stiffness tolerable. Hot flashes- had before arimidex, tolerable stable.  \par July 2020-  had  surgery for incarcerated umbilical hernia

## 2020-10-27 NOTE — ASSESSMENT
[FreeTextEntry1] : 48 y/o  post menopausal  woman with invasive ductal cancer of right breast T1a, N3 stage III C disease strongly ER/MN positive and HER 2 negative diagnosed in 2019. \par S/p bilateral mastectomies , right  axillary  dissection with LYMPHA  procedure and JASMYNE reconstruction.\par \par S/p adjuvant chemotherapy with DD ACT- completed  7/2/19. \par \par s/p   adjuvant RT ( Dr Ken).. \par \par Started adjuvant hormonal therapy- Arimidex in September 2019. SO far tolerating OK.\par \par She has high risk disease- total 10 years of adjuvant hormonal therapy recommended.\par \par Baseline bone density- later this year \par Takes Calcium with vit D Check level. \par \par \par Exam in 6 months.  \par

## 2020-10-27 NOTE — REVIEW OF SYSTEMS
[Patient Intake Form Reviewed] : Patient intake form was reviewed [Fatigue] : no fatigue [Joint Stiffness] : no joint stiffness [Insomnia] : no insomnia [Hot Flashes] : hot flashes [Negative] : Psychiatric

## 2020-11-09 ENCOUNTER — APPOINTMENT (OUTPATIENT)
Dept: RADIOLOGY | Facility: CLINIC | Age: 50
End: 2020-11-09
Payer: COMMERCIAL

## 2020-11-09 ENCOUNTER — OUTPATIENT (OUTPATIENT)
Dept: OUTPATIENT SERVICES | Facility: HOSPITAL | Age: 50
LOS: 1 days | End: 2020-11-09
Payer: COMMERCIAL

## 2020-11-09 DIAGNOSIS — Z98.890 OTHER SPECIFIED POSTPROCEDURAL STATES: Chronic | ICD-10-CM

## 2020-11-09 DIAGNOSIS — Z90.13 ACQUIRED ABSENCE OF BILATERAL BREASTS AND NIPPLES: Chronic | ICD-10-CM

## 2020-11-09 DIAGNOSIS — Z00.8 ENCOUNTER FOR OTHER GENERAL EXAMINATION: ICD-10-CM

## 2020-11-09 PROCEDURE — 77085 DXA BONE DENSITY AXL VRT FX: CPT | Mod: 26

## 2020-11-09 PROCEDURE — 77085 DXA BONE DENSITY AXL VRT FX: CPT

## 2021-02-04 ENCOUNTER — OUTPATIENT (OUTPATIENT)
Dept: OUTPATIENT SERVICES | Facility: HOSPITAL | Age: 51
LOS: 1 days | End: 2021-02-04
Payer: COMMERCIAL

## 2021-02-04 DIAGNOSIS — Z90.13 ACQUIRED ABSENCE OF BILATERAL BREASTS AND NIPPLES: Chronic | ICD-10-CM

## 2021-02-04 DIAGNOSIS — Z20.828 CONTACT WITH AND (SUSPECTED) EXPOSURE TO OTHER VIRAL COMMUNICABLE DISEASES: ICD-10-CM

## 2021-02-04 DIAGNOSIS — Z98.890 OTHER SPECIFIED POSTPROCEDURAL STATES: Chronic | ICD-10-CM

## 2021-02-04 LAB — SARS-COV-2 RNA SPEC QL NAA+PROBE: SIGNIFICANT CHANGE UP

## 2021-02-04 PROCEDURE — C9803: CPT

## 2021-02-04 PROCEDURE — U0003: CPT

## 2021-02-04 PROCEDURE — U0005: CPT

## 2021-02-05 DIAGNOSIS — Z20.828 CONTACT WITH AND (SUSPECTED) EXPOSURE TO OTHER VIRAL COMMUNICABLE DISEASES: ICD-10-CM

## 2021-04-23 NOTE — ASU PATIENT PROFILE, ADULT - PSH
Patient arrived ambulatory to infusion center for chemotherapy and MD visit. See visit note for assessment. Will proceed with treatment as planned. Patient agreed.     Treatment given and tolerated well.   Reviewed post dc instructions- aware to call for any problem/concerns.F/u appt given. Dc ambulatory stable @ 13:20      H/O bilateral mastectomy    S/P bilateral breast reduction  2007  S/P TRAM (transverse rectus abdominis muscle) flap breast reconstruction

## 2021-04-28 DIAGNOSIS — Z01.818 ENCOUNTER FOR OTHER PREPROCEDURAL EXAMINATION: ICD-10-CM

## 2021-04-30 ENCOUNTER — OUTPATIENT (OUTPATIENT)
Dept: OUTPATIENT SERVICES | Facility: HOSPITAL | Age: 51
LOS: 1 days | Discharge: HOME | End: 2021-04-30

## 2021-04-30 DIAGNOSIS — Z90.13 ACQUIRED ABSENCE OF BILATERAL BREASTS AND NIPPLES: Chronic | ICD-10-CM

## 2021-04-30 DIAGNOSIS — Z98.890 OTHER SPECIFIED POSTPROCEDURAL STATES: Chronic | ICD-10-CM

## 2021-04-30 DIAGNOSIS — C50.911 MALIGNANT NEOPLASM OF UNSPECIFIED SITE OF RIGHT FEMALE BREAST: ICD-10-CM

## 2021-05-03 ENCOUNTER — NON-APPOINTMENT (OUTPATIENT)
Age: 51
End: 2021-05-03

## 2021-05-03 ENCOUNTER — APPOINTMENT (OUTPATIENT)
Dept: HEMATOLOGY ONCOLOGY | Facility: CLINIC | Age: 51
End: 2021-05-03
Payer: COMMERCIAL

## 2021-05-03 ENCOUNTER — APPOINTMENT (OUTPATIENT)
Dept: BREAST CENTER | Facility: CLINIC | Age: 51
End: 2021-05-03
Payer: COMMERCIAL

## 2021-05-03 VITALS
DIASTOLIC BLOOD PRESSURE: 95 MMHG | WEIGHT: 200 LBS | HEART RATE: 95 BPM | HEIGHT: 66 IN | BODY MASS INDEX: 32.14 KG/M2 | SYSTOLIC BLOOD PRESSURE: 152 MMHG

## 2021-05-03 DIAGNOSIS — R74.8 ABNORMAL LEVELS OF OTHER SERUM ENZYMES: ICD-10-CM

## 2021-05-03 DIAGNOSIS — Z17.0 ESTROGEN RECEPTOR POSITIVE STATUS [ER+]: ICD-10-CM

## 2021-05-03 PROCEDURE — 99072 ADDL SUPL MATRL&STAF TM PHE: CPT

## 2021-05-03 PROCEDURE — 99213 OFFICE O/P EST LOW 20 MIN: CPT

## 2021-05-03 PROCEDURE — 99214 OFFICE O/P EST MOD 30 MIN: CPT

## 2021-05-03 NOTE — DATA REVIEWED
[FreeTextEntry1] : Mammogram: ( [facility])  11/05/18   Findings:  Extensive post reduction scarring bilaterally.  Increasing architectural distortion in the right breast at 11 and 12:00. Grouped punctate calcifications at 11:00  right breast.  Oval asymmetry Left breast centrally seen only on the MLO view.\par \par Gene. Diagnostic Mammogram:  11/30/18   Findings:  Heterogenous calcifications right breast 11:00 which are suspicious for malignancy.  The oval asymmetry of the left breast resolves with compression imaging.  \par  \par Breast ultrasound:  11/30/18  Findings:   Bilateral architectural distortion.  No discrete masses. \par \par Right stereotactic biopsy ( 11:00 at [facility]):  1/3/19   PATHOLOGY: Moderately differentiated infiltrating ductal carcinoma., DCIS cribriform and micropapillary patterns.  ER-.95%, MA - 95%, HER2 - negative.\par \par MRI:  1/1019  Findings:  Irregular enhancement of the outer central right breast spanning up to 5.5 cm in AP dimension and 2.2 cm in the transverse dimension c/w areas of architectural distortion and calcifications on mammography.   N suspicious findings in the left breast.  No adenopathy.  9 mm T2 bright lesion in the liver.\par \par SURGICAL PATHOLOGY: 2/13/19\par Left mastectomy - no suspicious findings.\par Left retronipple tissue - negative.\par Right mastectomy - 5 mm invasive ductal carcinoma.  Extensive DCIS.\par R sentinel node  # 1 -  1 cm metastasis . Extracapsular  extension present. \par Nonsentinel node # 1 -  metastatic carcinoma with extracapsular extension. \par Right retronipple tissue - negative. Extracapsular extension present.\par Right axillary contents - Metastatic carcinoma to 8 of 11 nodes.  \par Right Internal mammary lymph node - negative.\par [id number]  Clinical stage IIIB

## 2021-05-03 NOTE — HISTORY OF PRESENT ILLNESS
[FreeTextEntry1] : 49 year old female underwent bilateral nipple sparing mastectomies with JASMYNE flaps, right axillary sentinel node biopsy and completion axillary node dissection on 2/13/19.  She subsequently received adjuvant chemotherapy followed by radiation therapy to the right breast for stage IIIC ( T1a, N3, ER+, DC +, HER2 negative).   She presents for a surveillance breast examination.

## 2021-05-03 NOTE — PHYSICAL EXAM
[Obese] : obese [Normal] : affect appropriate [de-identified] : anicteric [de-identified] : no c/c/e [de-identified] : deferred - just seen by Dr. Campbell earlier today.  [de-identified] : no rashes

## 2021-05-03 NOTE — REVIEW OF SYSTEMS
[Patient Intake Form Reviewed] : Patient intake form was reviewed [Joint Pain] : joint pain [Joint Stiffness] : joint stiffness [Hot Flashes] : hot flashes [Negative] : Allergic/Immunologic

## 2021-05-03 NOTE — ASSESSMENT
[FreeTextEntry1] : Patient is a 50 y.o. with an ER+, HER2-, genetic testing neg, right sided stage IIIC breast cancer, s/p B/L mastectomies, JASMYNE reconstruction, adjuvant chemotherapy with DDACT, XRT, and started Arimidex 9/2019.  \par She is tolerating this well and clinically remains without evidence of disease.  \par Plan - Continue AI x 10 years. \par Continue with surveillance:\par Mammo/Sono: N/A\par GYN:  Has an appointment to see in June. \par Bone Density: 11/9/20 - Osteopenia with T-score spine -2.1.  Was instructed to continue with Calcium and Vitamin D and exercise. Repeat in 11/2022. If worse will need to consider medication. \par Briefly discussed PO vs. SQ Prolia.  Aware of need for dental clearance due to small risk for ONJ.\par Colonoscopy: Scheduled for September (MD at [facility]).\par PE in 6 months. \par \par Shwetha WEST ([facility]) \par Holly Ken\par Kierra Ribera\par Gabbie Faust - changer to Mckayla Willis\par

## 2021-05-03 NOTE — HISTORY OF PRESENT ILLNESS
[Disease: _____________________] : Disease: [unfilled] [T: ___] : T[unfilled] [N: ___] : N[unfilled] [AJCC Stage: ____] : AJCC Stage: [unfilled] [de-identified] : MARIANA SantoroRENETTAJOSIE ILNDA is a 50 y.o. with a PMH significant for GERD, who we are following for an ER+, HER2-, right sided stage IIIC breast cancer.  \par \par 11/5/18 - Presented at age 47(COLOR negative, was felt to be postmenopausal) with an abnormality on routine mammo - Right breast UOQ.\par 1/3/19 - Right breast biopsy - Moderately diff IDC (6/9) and DCIS, ER 95%, MA 95%, HER2-\par 1/10/19 - MRI - 5.5 x 2.2cm area of irregular enhancement right outer breast.  No lymphadenopathy.  9mm lesion in the liver.\par \par 2/13/19 - B/L nipple sparing mastectomy with JASMYNE reconstruction - 0.5cm mod diff IDC with DCIS, cribriform, intermediate nuclear grade with focal necrosis.  Right SLN with a 1cm met and TUAN was present.  13 additional LN's were removed for a total of 14.  10/14 LN's +.  Receptors repeated on LN - ER 95%, MA 95%, HER2-\par pT1a, pN3 = IIIC. \par PET/CT denied by insurance company. \par 2/23/19 - CT Chest - 3mm RUL nodular changes stable from 2014.  SUN calcified granuloma.  \par She had CT Angio A/P prior to surgery - 1.8cm posterior hepatic cyst in right lobe and area of concern on MRI was a left lobe cyst.  Small umbilical hernia. \par 2/15/19 - Bone scan - negative. \par \par 3/26/19 - 7/2/19 - Adjuvant chemotherapy with DDAC-DDT.   \par 8/6/19 - 9/11/19 - XRT to right chest wall and right supracla/axilla\par \par 9/2019 - Started on Arimidex [de-identified] : mod diff IDC with DCIS, cribriform, intermediate nuclear grade with focal necrosis [de-identified] : ER 95%, [place] 95%, HER2- (both biopsy and repeated on LN) [de-identified] : 1/22/19 - COLOR - negative [de-identified] : Joint stiffness from Arimidex - mostly in hands, especially in the morning, but manageable.  Still with some hot flashes at night. \par No changes in her family, medical, or social history since her last visit of 10/27/20.

## 2021-05-03 NOTE — PHYSICAL EXAM
[Sclera nonicteric] : sclera nonicteric [Conjunctiva pink] : conjunctiva pink [Supple] : supple [No Supraclavicular Adenopathy] : no supraclavicular adenopathy [No Cervical Adenopathy] : no cervical adenopathy [No Thyromegaly] : no thyromegaly [Clear to Auscultation Bilat] : clear to auscultation bilaterally [Normal Sinus Rhythm] : normal sinus rhythm [No Gallops] : no gallops [No Rubs] : no pericardial rub [Examined in the supine and seated position] : examined in the supine and seated position [Grade 3] : Ptosis Grade 3 [No dominant masses] : no dominant masses in right breast  [No dominant masses] : no dominant masses left breast [No Nipple Retraction] : no left nipple retraction [No Nipple Discharge] : no left nipple discharge [No Axillary Lymphadenopathy] : no left axillary lymphadenopathy [Soft] : abdomen soft [No Hepato-Splenomegaly] : no hepato-splenomegaly [No Edema] : no edema [No Rashes] : no rashes [No Ulceration] : no ulceration [Asymmetrical] : asymmetrical [de-identified] : L>R [de-identified] : Mastectomy incision inframmary fold.  JASMYNE flap warm, viable.   [de-identified] : S/P prophylactic mastectomy with JASMYNE flap.  Skin flaps and nipple areola complex are viable.

## 2021-05-04 DIAGNOSIS — M85.80 OTHER SPECIFIED DISORDERS OF BONE DENSITY AND STRUCTURE, UNSPECIFIED SITE: ICD-10-CM

## 2021-05-04 DIAGNOSIS — Z08 ENCOUNTER FOR FOLLOW-UP EXAMINATION AFTER COMPLETED TREATMENT FOR MALIGNANT NEOPLASM: ICD-10-CM

## 2021-05-04 DIAGNOSIS — Z17.0 ESTROGEN RECEPTOR POSITIVE STATUS [ER+]: ICD-10-CM

## 2021-05-04 DIAGNOSIS — Z51.81 ENCOUNTER FOR THERAPEUTIC DRUG LEVEL MONITORING: ICD-10-CM

## 2021-05-24 NOTE — PATIENT PROFILE ADULT - NSPROPASSIVESMOKEEXPOSURE_GEN_A_NUR
Demond Alan is a 64 year old male here for  Chief Complaint   Patient presents with   • Cancer     Denies latex allergy or sensitivity.    Medication verified, no changes.  PCP and Pharmacy verified.    Social History     Tobacco Use   Smoking Status Never Smoker   Smokeless Tobacco Former User   • Types: Snuff, Chew   Tobacco Comment    Uses chew ~ 30 years/ tin/day     Advance Directives Filed: No    ECOG:   ECOG [05/24/21 0815]   ECOG Performance Status 0       Height: No.  Ht Readings from Last 1 Encounters:   05/24/21 6' 0.99\" (1.854 m)     Weight:Yes, shoes off.  Wt Readings from Last 3 Encounters:   05/24/21 115.7 kg   04/26/21 118.4 kg   03/29/21 120.7 kg       BMI: Body mass index is 33.66 kg/m².    REVIEW OF SYSTEMS  GENERAL:  Patient denies headache, fevers, chills, night sweats, excessive fatigue, change in appetite, weight loss, dizziness  ALLERGIC/IMMUNOLOGIC: Verified allergies: Yes  EYES:  Patient denies significant visual difficulties, double vision, blurred vision  ENT/MOUTH: Patient denies problems with hearing, sore throat, sinus drainage, mouth sores  ENDOCRINE:  Patient denies diabetes, thyroid disease, hormone replacement, hot flashes  HEMATOLOGIC/LYMPHATIC: Patient denies easy bruising, bleeding, tender lymph nodes, swollen lymph nodes  BREASTS: Patient denies abnormal masses of breast, nipple discharge, pain  RESPIRATORY:  Patient denies lung pain with breathing, cough, coughing up blood, shortness of breath  CARDIOVASCULAR:  Patient denies anginal chest pain, palpitations, shortness of breath when lying flat, peripheral edema  GASTROINTESTINAL: Patient denies abdominal pain , nausea, vomiting, diarrhea, GI bleeding, constipation, change in bowel habits, heartburn, sensation of feeling full, difficulty swallowing  : Patient denies blood in the urine, burning with urination, frequency, urgency, hesitancy, incontinence  MUSCULOSKELETAL:  Patient denies joint pain, bone pain, joint  swelling, redness, decreased range of motion  SKIN:  Patient denies chronic rashes, inflammation, ulcerations, skin changes, itching  NEUROLOGIC:  Patient denies loss of balance, areas of focal weakness, abnormal gait, sensory problems, numbness, tingling  PSYCHIATRIC: Patient denies insomnia, depression, anxiety     No

## 2021-08-28 NOTE — ASU PATIENT PROFILE, ADULT - MEDICATION HERBAL REMEDIES, PROFILE
no Report received from TREY DAVIS Pt. received A&Ox2, not oriented to time; with 20G IV in right ac. On cardiac monitor, NSR. Offers no complaints at present. Respirations even & unlabored. Resting comfortably at present. Admitted to MICU, report given. Facilitator RN - Report received from TREY Rudd. Pt. received A&Ox2, not oriented to time; with 20G IV in right ac. On cardiac monitor, NSR. Offers no complaints at present. Respirations even & unlabored. Resting comfortably at present. Admitted to MICU, report given by TREY Austin.

## 2021-11-04 ENCOUNTER — OUTPATIENT (OUTPATIENT)
Dept: OUTPATIENT SERVICES | Facility: HOSPITAL | Age: 51
LOS: 1 days | Discharge: HOME | End: 2021-11-04

## 2021-11-04 DIAGNOSIS — C50.911 MALIGNANT NEOPLASM OF UNSPECIFIED SITE OF RIGHT FEMALE BREAST: ICD-10-CM

## 2021-11-04 DIAGNOSIS — Z98.890 OTHER SPECIFIED POSTPROCEDURAL STATES: Chronic | ICD-10-CM

## 2021-11-04 DIAGNOSIS — Z90.13 ACQUIRED ABSENCE OF BILATERAL BREASTS AND NIPPLES: Chronic | ICD-10-CM

## 2021-11-05 ENCOUNTER — RESULT REVIEW (OUTPATIENT)
Age: 51
End: 2021-11-05

## 2021-11-05 ENCOUNTER — APPOINTMENT (OUTPATIENT)
Dept: HEMATOLOGY ONCOLOGY | Facility: CLINIC | Age: 51
End: 2021-11-05
Payer: COMMERCIAL

## 2021-11-05 VITALS — SYSTOLIC BLOOD PRESSURE: 137 MMHG | DIASTOLIC BLOOD PRESSURE: 90 MMHG | HEART RATE: 103 BPM | TEMPERATURE: 98 F

## 2021-11-05 DIAGNOSIS — R79.89 OTHER SPECIFIED ABNORMAL FINDINGS OF BLOOD CHEMISTRY: ICD-10-CM

## 2021-11-05 DIAGNOSIS — Z92.29 PERSONAL HISTORY OF OTHER DRUG THERAPY: ICD-10-CM

## 2021-11-05 LAB
BASOPHILS # BLD AUTO: 0.03 K/UL — SIGNIFICANT CHANGE UP (ref 0–0.2)
BASOPHILS NFR BLD AUTO: 0.6 % — SIGNIFICANT CHANGE UP (ref 0–2)
EOSINOPHIL # BLD AUTO: 0.15 K/UL — SIGNIFICANT CHANGE UP (ref 0–0.5)
EOSINOPHIL NFR BLD AUTO: 3.2 % — SIGNIFICANT CHANGE UP (ref 0–6)
HCT VFR BLD CALC: 37 % — SIGNIFICANT CHANGE UP (ref 34.5–45)
HGB BLD-MCNC: 12.5 G/DL — SIGNIFICANT CHANGE UP (ref 11.5–15.5)
IMM GRANULOCYTES NFR BLD AUTO: 0.4 % — SIGNIFICANT CHANGE UP (ref 0–1.5)
LYMPHOCYTES # BLD AUTO: 0.79 K/UL — LOW (ref 1–3.3)
LYMPHOCYTES # BLD AUTO: 16.7 % — SIGNIFICANT CHANGE UP (ref 13–44)
MCHC RBC-ENTMCNC: 29.8 PG — SIGNIFICANT CHANGE UP (ref 27–34)
MCHC RBC-ENTMCNC: 33.8 GM/DL — SIGNIFICANT CHANGE UP (ref 32–36)
MCV RBC AUTO: 88.3 FL — SIGNIFICANT CHANGE UP (ref 80–100)
MONOCYTES # BLD AUTO: 0.51 K/UL — SIGNIFICANT CHANGE UP (ref 0–0.9)
MONOCYTES NFR BLD AUTO: 10.8 % — SIGNIFICANT CHANGE UP (ref 2–14)
NEUTROPHILS # BLD AUTO: 3.24 K/UL — SIGNIFICANT CHANGE UP (ref 1.8–7.4)
NEUTROPHILS NFR BLD AUTO: 68.3 % — SIGNIFICANT CHANGE UP (ref 43–77)
NRBC # BLD: 0 /100 WBCS — SIGNIFICANT CHANGE UP (ref 0–0)
PLATELET # BLD AUTO: 200 K/UL — SIGNIFICANT CHANGE UP (ref 150–400)
RBC # BLD: 4.19 M/UL — SIGNIFICANT CHANGE UP (ref 3.8–5.2)
RBC # FLD: 12.1 % — SIGNIFICANT CHANGE UP (ref 10.3–14.5)
WBC # BLD: 4.74 K/UL — SIGNIFICANT CHANGE UP (ref 3.8–10.5)
WBC # FLD AUTO: 4.74 K/UL — SIGNIFICANT CHANGE UP (ref 3.8–10.5)

## 2021-11-05 PROCEDURE — 99213 OFFICE O/P EST LOW 20 MIN: CPT

## 2021-11-06 LAB
24R-OH-CALCIDIOL SERPL-MCNC: 36.5 NG/ML — SIGNIFICANT CHANGE UP (ref 30–80)
ALBUMIN SERPL ELPH-MCNC: 4.7 G/DL — SIGNIFICANT CHANGE UP (ref 3.3–5)
ALP SERPL-CCNC: 126 U/L — HIGH (ref 40–120)
ALT FLD-CCNC: 16 U/L — SIGNIFICANT CHANGE UP (ref 10–45)
ANION GAP SERPL CALC-SCNC: 15 MMOL/L — SIGNIFICANT CHANGE UP (ref 5–17)
AST SERPL-CCNC: 20 U/L — SIGNIFICANT CHANGE UP (ref 10–40)
BILIRUB SERPL-MCNC: 0.4 MG/DL — SIGNIFICANT CHANGE UP (ref 0.2–1.2)
BUN SERPL-MCNC: 14 MG/DL — SIGNIFICANT CHANGE UP (ref 7–23)
CALCIUM SERPL-MCNC: 9.7 MG/DL — SIGNIFICANT CHANGE UP (ref 8.4–10.5)
CHLORIDE SERPL-SCNC: 101 MMOL/L — SIGNIFICANT CHANGE UP (ref 96–108)
CO2 SERPL-SCNC: 22 MMOL/L — SIGNIFICANT CHANGE UP (ref 22–31)
CREAT SERPL-MCNC: 0.49 MG/DL — LOW (ref 0.5–1.3)
GLUCOSE SERPL-MCNC: 126 MG/DL — HIGH (ref 70–99)
POTASSIUM SERPL-MCNC: 4.6 MMOL/L — SIGNIFICANT CHANGE UP (ref 3.5–5.3)
POTASSIUM SERPL-SCNC: 4.6 MMOL/L — SIGNIFICANT CHANGE UP (ref 3.5–5.3)
PROT SERPL-MCNC: 7.1 G/DL — SIGNIFICANT CHANGE UP (ref 6–8.3)
SODIUM SERPL-SCNC: 138 MMOL/L — SIGNIFICANT CHANGE UP (ref 135–145)

## 2021-11-08 LAB — CANCER AG27-29 SERPL-ACNC: 18.3 U/ML — SIGNIFICANT CHANGE UP (ref 0–38.6)

## 2021-11-08 RX ORDER — METHYLPREDNISOLONE 4 MG/1
4 TABLET ORAL
Qty: 21 | Refills: 0 | Status: COMPLETED | COMMUNITY
Start: 2021-07-06

## 2021-11-08 RX ORDER — AMOXICILLIN AND CLAVULANATE POTASSIUM 500; 125 MG/1; MG/1
500-125 TABLET, FILM COATED ORAL
Qty: 20 | Refills: 0 | Status: DISCONTINUED | COMMUNITY
Start: 2021-07-06

## 2021-11-08 RX ORDER — AZELASTINE HYDROCHLORIDE 137 UG/1
0.1 SPRAY, METERED NASAL
Qty: 30 | Refills: 0 | Status: DISCONTINUED | COMMUNITY
Start: 2021-07-06 | End: 2021-11-08

## 2021-11-08 NOTE — RESULTS/DATA
[FreeTextEntry1] : WBC: 4.74  Hgb: 12.5  Hct: 37.0  MCV:  88.3  Plts: 200\par CMP, VitD, Ca27-29: pending

## 2021-11-08 NOTE — HISTORY OF PRESENT ILLNESS
[Disease: _____________________] : Disease: [unfilled] [T: ___] : T[unfilled] [N: ___] : N[unfilled] [AJCC Stage: ____] : AJCC Stage: [unfilled] [de-identified] : MARIANA SantoroRENETTAJOSIE LINDA is a 50 y.o. with a PMH significant for GERD, who we are following for an ER+, HER2-, right sided stage IIIC breast cancer.  \par \par 11/5/18 - Presented at age 47 (COLOR negative, was felt to be postmenopausal) with an abnormality on routine mammo - Right breast UOQ.\par 1/3/19 - Right breast biopsy - Moderately diff IDC (6/9) and DCIS, ER 95%, [place] 95%, HER2-\par 1/10/19 - MRI - 5.5 x 2.2cm area of irregular enhancement right outer breast.  No lymphadenopathy.  9mm lesion in the liver.\par \par 2/13/19 - B/L nipple sparing mastectomy with JASMYNE reconstruction - 0.5cm mod diff IDC with DCIS, cribriform, intermediate nuclear grade with focal necrosis.  Right SLN with a 1cm met and TUAN was present.  13 additional LN's were removed for a total of 14.  10/14 LN's +.  Receptors repeated on LN - ER 95%, [place] 95%, HER2-\par pT1a, pN3 = IIIC. \par PET/CT denied by insurance company. \par 2/23/19 - CT Chest - 3mm RUL nodular changes stable from 2014.  SUN calcified granuloma.  \par She had CT Angio A/P prior to surgery - 1.8cm posterior hepatic cyst in right lobe and area of concern on MRI was a left lobe cyst.  Small umbilical hernia. \par 2/15/19 - Bone scan - negative. \par \par 3/26/19 - 7/2/19 - Adjuvant chemotherapy with DDAC-DDT.   \par 8/6/19 - 9/11/19 - XRT to right chest wall and right supracla/axilla\par \par 9/2019 - Started on Arimidex [de-identified] : mod diff IDC with DCIS, cribriform, intermediate nuclear grade with focal necrosis [de-identified] : ER 95%, [place] 95%, HER2- (both biopsy and repeated on LN) [de-identified] : 1/22/19 - COLOR - negative [de-identified] : Joint stiffness from Arimidex - mostly in hands, especially in the morning, but manageable.  Still with some hot flashes at night - tolerable. \par Had colonoscopy in Sept. \par Else no changes in her family, medical, or social history since her last visit of 5/3/21.

## 2021-11-08 NOTE — PHYSICAL EXAM
[Obese] : obese [Normal] : affect appropriate [de-identified] : anicteric [de-identified] : no c/c/e [de-identified] : no rashes

## 2021-11-08 NOTE — ASSESSMENT
[FreeTextEntry1] : Patient is a 50 y.o. with an ER+, HER2-, genetic testing neg, right sided stage IIIC breast cancer, s/p B/L mastectomies, JASMYNE reconstruction, adjuvant chemotherapy with DDACT, XRT, and started Arimidex 9/2019.  \par She is tolerating this well and clinically remains without evidence of disease.  \par Plan - Continue AI x 10 years. \par \par Continue with surveillance:\par Mammo/Sono: N/A\par GYN:  Saw last June - goes yearly. \par Bone Density: 11/9/20 - Osteopenia with T-score spine -2.1.  Was instructed to continue with Calcium and Vitamin D and exercise. Repeat in 11/2022. If worse will need to consider medication. \par  \par Colonoscopy: 9/21/21 (MD at [facility]) - hyperplastic polyp in rectum - was instructed to repeat in 1 year. \par PE in 6 months. \par \par Shwetha WEST ([facility]) \par Holly Ken\par Kierra Crumphcolette -> Jody Hayward\par Gabbie Faust - changer to Mckayla Willis\par Dr. Shania Soares (GI - [facility])\par

## 2021-11-09 DIAGNOSIS — M85.80 OTHER SPECIFIED DISORDERS OF BONE DENSITY AND STRUCTURE, UNSPECIFIED SITE: ICD-10-CM

## 2021-11-09 DIAGNOSIS — Z08 ENCOUNTER FOR FOLLOW-UP EXAMINATION AFTER COMPLETED TREATMENT FOR MALIGNANT NEOPLASM: ICD-10-CM

## 2021-11-09 DIAGNOSIS — Z51.81 ENCOUNTER FOR THERAPEUTIC DRUG LEVEL MONITORING: ICD-10-CM

## 2021-11-09 DIAGNOSIS — K63.89 OTHER SPECIFIED DISEASES OF INTESTINE: ICD-10-CM

## 2021-11-15 RX ORDER — ALPRAZOLAM 0.25 MG/1
0.25 TABLET ORAL
Qty: 60 | Refills: 0 | Status: ACTIVE | COMMUNITY
Start: 2019-02-22 | End: 1900-01-01

## 2021-11-18 ENCOUNTER — OUTPATIENT (OUTPATIENT)
Dept: OUTPATIENT SERVICES | Facility: HOSPITAL | Age: 51
LOS: 1 days | End: 2021-11-18
Payer: COMMERCIAL

## 2021-11-18 ENCOUNTER — APPOINTMENT (OUTPATIENT)
Dept: CT IMAGING | Facility: CLINIC | Age: 51
End: 2021-11-18
Payer: COMMERCIAL

## 2021-11-18 DIAGNOSIS — K63.89 OTHER SPECIFIED DISEASES OF INTESTINE: ICD-10-CM

## 2021-11-18 DIAGNOSIS — Z90.13 ACQUIRED ABSENCE OF BILATERAL BREASTS AND NIPPLES: Chronic | ICD-10-CM

## 2021-11-18 DIAGNOSIS — Z98.890 OTHER SPECIFIED POSTPROCEDURAL STATES: Chronic | ICD-10-CM

## 2021-11-18 PROCEDURE — 74177 CT ABD & PELVIS W/CONTRAST: CPT | Mod: 26

## 2021-11-18 PROCEDURE — 74177 CT ABD & PELVIS W/CONTRAST: CPT

## 2021-11-23 ENCOUNTER — NON-APPOINTMENT (OUTPATIENT)
Age: 51
End: 2021-11-23

## 2022-02-10 NOTE — ASU PREOP CHECKLIST - IV STARTED
"    Patient Name: Silvia Zabala  : 1962  MRN: [id number]    Date of Admission: 2022  Date of Discharge:  2/10/2022  Primary Care Physician: Kulwant Martínez APRN      Chief Complaint:   Syncope      Discharge Diagnoses     Active Hospital Problems    Diagnosis  POA   • **Near syncope [R55]  Yes   • Abnormal TSH [R79.89]  Yes   • Liver cirrhosis secondary to DUKES (HCC) [K75.81, K74.60]  Yes   • Portal hypertension (HCC) [K76.6]  Yes   • Type 2 diabetes mellitus, uncontrolled, with neuropathy (HCC) [E11.40, E11.65]  Yes   • Rheumatoid arthritis (HCC) [M06.9]  Yes      Resolved Hospital Problems   No resolved problems to display.        Hospital Course     Ms. Zabala is a 59 y.o. female with a history of liver cirrhosis secondary to DUKES who presented to [facility] initially complaining of a near syncopal episode.  Please see the admitting history and physical for further details.  She was monitored on telemetry with no abnormal arrhythmias.  Cardiology was consulted.  Echocardiogram was unremarkable.  Carotid Dopplers were also performed and were unremarkable.  She had an elevated TSH but normal free T4.  Would recommend repeating this in the outpatient setting when patient is not hospitalized.  She has chronic pancytopenia due to her liver cirrhosis.  She has had no further episodes of near syncope.  Plan is to discharge today with outpatient follow-up with her PCP.      Day of Discharge     Subjective:  Complains of \"migraine\" but sitting in room eating in no distress.  Some relief with Tylenol.  Feels well enough to be discharged.    Physical Exam:  Temp:  [98.1 °F (36.7 °C)-98.3 °F (36.8 °C)] 98.3 °F (36.8 °C)  Heart Rate:  [76-88] 76  Resp:  [18] 18  BP: (128-142)/(67-75) 128/69  Body mass index is 27.71 kg/m².  Physical Exam  Constitutional:       General: She is not in acute distress.     Appearance: She is not diaphoretic.   Cardiovascular:      Rate and Rhythm: Normal rate and " regular rhythm.      Heart sounds: Normal heart sounds.   Pulmonary:      Effort: Pulmonary effort is normal.      Breath sounds: Normal breath sounds.   Abdominal:      General: Bowel sounds are normal.      Palpations: Abdomen is soft.      Tenderness: There is no abdominal tenderness.   Musculoskeletal:         General: No swelling.   Skin:     General: Skin is warm and dry.   Neurological:      Mental Status: She is alert and oriented to person, place, and time.         Consultants     Consult Orders (all) (From admission, onward)     Start     Ordered    02/08/22 2206  Inpatient Nutrition Consult  Once        Provider:  (Not yet assigned)    02/08/22 2205    02/08/22 1149  [facility] (on-call MD unless specified)  Once        Specialty:  Cardiology  Provider:  Gildardo Smith III, MD    02/08/22 1149              Procedures     Imaging Results (All)     Procedure Component Value Units Date/Time    XR Chest 1 View [[id number]] Collected: 02/08/22 1127     Updated: 02/09/22 1640    Narrative:      SINGLE VIEW CHEST RADIOGRAPH     HISTORY: 59-year-old female with a history of dyspnea.     FINDINGS: A portable AP chest radiograph was obtained. Comparison is  made to a chest radiograph dated 09/20/2021. The lungs are normal in  volume and are clear. The cardiac silhouette is within normal limits. No  bony abnormality of the chest is appreciated.       Impression:      Negative chest radiograph.     This report was finalized on 2/9/2022 4:37 PM by Dr. Gee Reyna M.D.           Results for orders placed during the hospital encounter of 02/08/22    Duplex Carotid Ultrasound CAR    Interpretation Summary  · Proximal right internal carotid artery is normal.  · Proximal left internal carotid artery is normal.    Results for orders placed during the hospital encounter of 02/08/22    Adult Transthoracic Echo Complete W/ Cont if Necessary Per Protocol    Interpretation Summary  · Estimated left ventricular EF = 70% Left  ventricular systolic function is normal.  · Left ventricular diastolic function was normal.  · Left atrial volume is mildly increased.    Pertinent Labs     Results from last 7 days   Lab Units 02/10/22  0606 02/09/22  0331 02/08/22  0956   WBC 10*3/mm3 2.69* 2.51* 5.76   HEMOGLOBIN g/dL 10.8* 10.8* 12.8   PLATELETS 10*3/mm3 50* 60* 95*     Results from last 7 days   Lab Units 02/10/22  0606 02/09/22  0331 02/08/22  0956   SODIUM mmol/L 138 138 136   POTASSIUM mmol/L 4.1 4.1 3.4*   CHLORIDE mmol/L 107 108* 103   CO2 mmol/L 21.6* 19.2* 22.4   BUN mg/dL 17 15 20   CREATININE mg/dL 0.84 0.84 1.17*   GLUCOSE mg/dL 202* 293* 168*   EGFR IF NONAFRICN AM mL/min/1.73 69 69 47*     Results from last 7 days   Lab Units 02/08/22  0956   ALBUMIN g/dL 3.90   BILIRUBIN mg/dL 1.4*   ALK PHOS U/L 118*   AST (SGOT) U/L 35*   ALT (SGPT) U/L 37*     Results from last 7 days   Lab Units 02/10/22  0606 02/09/22  0331 02/08/22  0956   CALCIUM mg/dL 8.6 7.9* 8.9   ALBUMIN g/dL  --   --  3.90   MAGNESIUM mg/dL  --   --  1.8     Results from last 7 days   Lab Units 02/08/22  0956   LIPASE U/L 34     Results from last 7 days   Lab Units 02/08/22  1228 02/08/22  0956   TROPONIN T ng/mL <0.010 <0.010   PROBNP pg/mL  --  206.0           Invalid input(s): LDLCALC  Results from last 7 days   Lab Units 02/08/22  1250   URINECX  No growth     Results from last 7 days   Lab Units 02/08/22  1040   COVID19  Not Detected       Test Results Pending at Discharge       Discharge Details        Discharge Medications      Changes to Medications      Instructions Start Date   ferrous sulfate 325 (65 FE) MG tablet  What changed: when to take this   TAKE ONE TABLET BY MOUTH TWICE A DAY      hydrOXYzine 25 MG tablet  Commonly known as: ATARAX  What changed: when to take this   50 mg, Oral, Nightly      NovoLOG FlexPen 100 UNIT/ML solution pen-injector sc pen  Generic drug: insulin aspart  What changed: See the new instructions.   INJECT 60 UNITS THREE TIMES A  DAY WITH MELS      spironolactone 100 MG tablet  Commonly known as: ALDACTONE  What changed:   when to take this  reasons to take this  additional instructions   TAKE ONE TABLET BY MOUTH DAILY         Continue These Medications      Instructions Start Date   ALPRAZolam 1 MG tablet  Commonly known as: XANAX   0.5-1 mg, Oral, As Needed      COLACE PO   2 capsules, Oral, Every Night at Bedtime      FLUoxetine 10 MG capsule  Commonly known as: PROzac   20 mg, Oral, Daily      insulin detemir 100 UNIT/ML injection  Commonly known as: LEVEMIR   65 Units, Subcutaneous, 2 Times Daily      levETIRAcetam 500 MG tablet  Commonly known as: KEPPRA   TAKE ONE TABLET BY MOUTH TWICE A DAY      Magnesium Oxide 400 MG capsule   400 mg, Oral, Nightly      metoclopramide 10 MG tablet  Commonly known as: REGLAN   10 mg, Oral, 4 Times Daily Before Meals & Nightly      MiraLax 17 GM/SCOOP powder  Generic drug: polyethylene glycol   17 g, Oral, Daily PRN      multivitamin with minerals tablet tablet   1 tablet, Oral, Daily      Narcan 4 MG/0.1ML nasal spray  Generic drug: naloxone   1 spray, Nasal, As Needed      Oxaydo 7.5 MG tablet  Generic drug: oxyCODONE HCl   7.5 mg, Oral, 2 Times Daily      pregabalin 75 MG capsule  Commonly known as: LYRICA   75 mg, Oral, 2 Times Daily      promethazine 25 MG tablet  Commonly known as: PHENERGAN   25 mg, Oral, Every 8 Hours PRN      riFAXIMin 550 MG tablet  Commonly known as: Xifaxan   550 mg, Oral, 2 Times Daily      Scopolamine 1 MG/3DAYS patch   1 patch, Transdermal, Every 3 Days      sertraline 50 MG tablet  Commonly known as: ZOLOFT   50 mg, Oral, Daily      Skelaxin 800 MG tablet  Generic drug: metaxalone   800 mg, Oral, 3 Times Daily      vitamin B-12 1000 MCG tablet  Commonly known as: CYANOCOBALAMIN   1,000 mcg, Oral, Daily      Vitamin D3 25 MCG (1000 UT) capsule   1,000 Units, Oral, Daily      zinc gluconate 50 MG tablet   50 mg, Oral, Daily      zolpidem 5 MG tablet  Commonly known as:  "AMBIEN   5 mg, Oral, Nightly             Allergies   Allergen Reactions   • Albuterol Anaphylaxis   • Imitrex [Sumatriptan] Anaphylaxis   • Tramadol Nausea Only, Other (See Comments) and GI Intolerance     Per pt causes her \"palsy, meaning shaking and tremors\"    • Coconut (Cocos Nucifera) Allergy Skin Test Nausea And Vomiting   • Nsaids Other (See Comments)     Told by MD not to take due to liver problems   • Tylenol [Acetaminophen] Other (See Comments)     Has liver problems and told by MD to not take   • Zofran [Ondansetron Hcl] Other (See Comments)     Pt states does not work to correct nausea and vomiting.    • Codeine Nausea Only, Rash and GI Intolerance   • Lactulose Nausea And Vomiting and Other (See Comments)     Severe abdominal pain   • Quinine Derivatives Nausea And Vomiting       Discharge Disposition:  Home or Self Care      Discharge Diet:  No active diet order      Discharge Activity:   Activity Instructions     Activity as Tolerated            CODE STATUS:    Code Status and Medical Interventions:   Ordered at: 02/08/22 6393     Code Status (Patient has no pulse and is not breathing):    CPR (Attempt to Resuscitate)     Medical Interventions (Patient has pulse or is breathing):    Full       Future Appointments   Date Time Provider Department Center   3/25/2022 10:50 AM [facility]   3/28/2022  3:00 PM LAB CHAIR 5 [facility]  [facility]   4/4/2022  3:00 PM VIT[facility] ONLY [facility]   4/4/2022  3:20 PM Sukhdeep Mcdowell MD [facility]   4/8/2022 10:00 AM Daysi Kelley APRN [facility]   6/20/2022  9:30 AM Kulwant Martínez APRN [facility]   8/19/2022  1:00 PM Merary Burnett MD [facility]      Follow-up Information     Kulwant Martínez APRN Follow up in 2 week(s).    Specialty: Internal Medicine  Contact information:  [address] [address]  [phone]                       Ed Spencer MD  [facility]  02/10/22  08:44 EST               yes/insitu

## 2022-05-07 NOTE — ASU DISCHARGE PLAN (ADULT/PEDIATRIC). - NURSING INSTRUCTIONS
Begin with liquids and light food ( tea, toast, Jello, soups). Advance to what you normally eat. Liquids should taken in adequate amounts today.     CALL the DOCTOR:    -Fever greater than  101F  - Signs  of infection such as : increase pain,swelling,redness,or a bad  smell coming from the wound.  -Excessive amount of bleeding.  - Any pain that appears to be getting worse.  - Vomiting  -  If you have  not urinated 8 hours after surgery or have any difficulty urinating.     A responsible adult should be with you for the rest of the day and night for your safety and to help you if you needed.    Review attached FACT SHEET if applicable.
Yes

## 2022-06-01 ENCOUNTER — APPOINTMENT (OUTPATIENT)
Dept: BREAST CENTER | Facility: CLINIC | Age: 52
End: 2022-06-01
Payer: COMMERCIAL

## 2022-06-01 VITALS
HEIGHT: 66 IN | WEIGHT: 200 LBS | DIASTOLIC BLOOD PRESSURE: 95 MMHG | HEART RATE: 111 BPM | BODY MASS INDEX: 32.14 KG/M2 | SYSTOLIC BLOOD PRESSURE: 141 MMHG

## 2022-06-01 PROCEDURE — 99213 OFFICE O/P EST LOW 20 MIN: CPT

## 2022-06-01 RX ORDER — OXYMETAZOLINE HYDROCHLORIDE 1 G/100G
1 CREAM TOPICAL
Qty: 30 | Refills: 0 | Status: ACTIVE | COMMUNITY
Start: 2022-02-15

## 2022-06-01 RX ORDER — TRETINOIN 0.5 MG/G
0.05 CREAM TOPICAL
Qty: 45 | Refills: 0 | Status: ACTIVE | COMMUNITY
Start: 2022-02-10

## 2022-06-01 RX ORDER — NITROFURANTOIN (MONOHYDRATE/MACROCRYSTALS) 25; 75 MG/1; MG/1
100 CAPSULE ORAL
Qty: 14 | Refills: 0 | Status: DISCONTINUED | COMMUNITY
Start: 2021-12-22 | End: 2022-06-01

## 2022-06-01 NOTE — PHYSICAL EXAM
[Sclera nonicteric] : sclera nonicteric [Conjunctiva pink] : conjunctiva pink [Supple] : supple [No Supraclavicular Adenopathy] : no supraclavicular adenopathy [No Cervical Adenopathy] : no cervical adenopathy [No Thyromegaly] : no thyromegaly [Clear to Auscultation Bilat] : clear to auscultation bilaterally [Normal Sinus Rhythm] : normal sinus rhythm [No Gallops] : no gallops [No Rubs] : no pericardial rub [Examined in the supine and seated position] : examined in the supine and seated position [Asymmetrical] : asymmetrical [Grade 3] : Ptosis Grade 3 [No dominant masses] : no dominant masses in right breast  [No dominant masses] : no dominant masses left breast [No Nipple Retraction] : no left nipple retraction [No Nipple Discharge] : no left nipple discharge [No Axillary Lymphadenopathy] : no left axillary lymphadenopathy [Soft] : abdomen soft [No Edema] : no edema [No Rashes] : no rashes [No Ulceration] : no ulceration [Not Tender] : non-tender [de-identified] : L>R [de-identified] : Inframammary scar. Mild tenderness lower inner right. [de-identified] : Inframammary scar.

## 2022-06-01 NOTE — HISTORY OF PRESENT ILLNESS
[FreeTextEntry1] : 51 year old female underwent bilateral nipple sparing mastectomies with JASMYNE flaps, right axillary sentinel node biopsy and completion axillary node dissection on 2/13/19 with Dr. Campbell/Katty.  She subsequently received adjuvant chemotherapy followed by radiation therapy to the right breast for stage IIIC breast cancer ( T1a, N3, ER+, NJ +, HER2 negative).   She is on Arimidex.\par \par She had some pain in the inner right breast a few weeks ago, but it went away.  She still has some tenderness in the area when she presses.

## 2022-06-01 NOTE — DATA REVIEWED
[FreeTextEntry1] : SURGICAL PATHOLOGY: 2/13/19\par Left mastectomy - no suspicious findings.\par Left retronipple tissue - negative.\par Right mastectomy - 5 mm invasive ductal carcinoma.  Extensive DCIS.\par R sentinel node  # 1 -  1 cm metastasis . Extracapsular  extension present. \par Nonsentinel node # 1 -  metastatic carcinoma with extracapsular extension. \par Right retronipple tissue - negative. Extracapsular extension present.\par Right axillary contents - Metastatic carcinoma to 8 of 11 nodes.  \par Right Internal mammary lymph node - negative.\par [id number]  Clinical stage IIIB

## 2022-09-19 ENCOUNTER — NON-APPOINTMENT (OUTPATIENT)
Age: 52
End: 2022-09-19

## 2022-09-19 ENCOUNTER — APPOINTMENT (OUTPATIENT)
Dept: COLORECTAL SURGERY | Facility: CLINIC | Age: 52
End: 2022-09-19

## 2022-09-19 VITALS
WEIGHT: 200 LBS | OXYGEN SATURATION: 98 % | DIASTOLIC BLOOD PRESSURE: 90 MMHG | HEART RATE: 106 BPM | SYSTOLIC BLOOD PRESSURE: 135 MMHG | HEIGHT: 66 IN | TEMPERATURE: 97.3 F | BODY MASS INDEX: 32.14 KG/M2 | RESPIRATION RATE: 16 BRPM

## 2022-09-19 DIAGNOSIS — Z82.0 FAMILY HISTORY OF EPILEPSY AND OTHER DISEASES OF THE NERVOUS SYSTEM: ICD-10-CM

## 2022-09-19 DIAGNOSIS — Z82.3 FAMILY HISTORY OF STROKE: ICD-10-CM

## 2022-09-19 DIAGNOSIS — Z87.19 PERSONAL HISTORY OF OTHER DISEASES OF THE DIGESTIVE SYSTEM: ICD-10-CM

## 2022-09-19 DIAGNOSIS — Z82.49 FAMILY HISTORY OF ISCHEMIC HEART DISEASE AND OTHER DISEASES OF THE CIRCULATORY SYSTEM: ICD-10-CM

## 2022-09-19 PROCEDURE — 99204 OFFICE O/P NEW MOD 45 MIN: CPT | Mod: 25

## 2022-09-19 PROCEDURE — 46600 DIAGNOSTIC ANOSCOPY SPX: CPT

## 2022-09-19 NOTE — PHYSICAL EXAM
[Normal rectal exam] : exam was normal [Excoriation] : excoriations [Reduce Spontaneously] : a spontaneously reducible (grade II) [Skin Tags] : residual hemorrhoidal skin tags were noted [Normal] : was normal [None] : there was no rectal mass  [Gross Blood] : no gross blood [No Rash or Lesion] : No rash or lesion [Alert] : alert [Oriented to Person] : oriented to person [Oriented to Place] : oriented to place [Oriented to Time] : oriented to time [Calm] : calm [de-identified] : anterior  [de-identified] : No apparent distress [de-identified] : Normocephalic atraumatic [de-identified] : Moving all extremities x4

## 2022-09-19 NOTE — ASSESSMENT
[FreeTextEntry1] : Ms. Thomas presents to the office with perianal itching swelling and occasional bleeding.  MAVERICK and anoscopy in office today revealed moderate pruritus ani along the skin of the postanal space as well as at the level of an anterior anal skin tag.  Her internal hemorrhoids were grade 2 and otherwise unremarkable.  On further questioning, she does admit to excess wiping of perianal skin during toileting and also admits to awakening at night with perianal itching.  I have recommended she limit anorectal hygiene for the next 2 weeks to rinsing with warm water and patting dry before applying clobetasol twice daily.  In addition, I have advised her to use Calmoseptine at night and liberally for symptomatic relief.  Healing of the perianal skin should become apparent by 2 weeks and then she can return to the office at approximately 3 weeks for bedside excision of her anterior anal skin tag.  She is agreeable with this plan of care and all questions were answered to her satisfaction.

## 2022-09-19 NOTE — HISTORY OF PRESENT ILLNESS
[FreeTextEntry1] : Ms. Thomas presents to the office for consultation secondary to a chronic history of perianal irritation, occasional bleeding and discomfort.  She attributes her symptoms to her internal and external hemorrhoids.  Bowel movements are passed on a daily basis and typically without straining.  Last colonoscopy was noted to be 1 year prior.

## 2022-10-05 NOTE — PAST MEDICAL HISTORY
[Menarche Age ____] : age at menarche was [unfilled] [Amenorrhea] : amenorrhea Consent: Written consent obtained. Risks include but not limited to bruising, beading, irregular texture, ulceration, infection, allergic reaction, scar formation, incomplete augmentation, temporary nature, procedural pain. [Total Preg ___] : G[unfilled] [Live Births ___] : P[unfilled]  [Age At Live Birth ___] : Age at live birth: [unfilled] [FreeTextEntry1] : IUD [FreeTextEntry7] : Mirena IUD

## 2022-10-14 ENCOUNTER — APPOINTMENT (OUTPATIENT)
Dept: COLORECTAL SURGERY | Facility: CLINIC | Age: 52
End: 2022-10-14

## 2022-10-14 ENCOUNTER — EMERGENCY (EMERGENCY)
Facility: HOSPITAL | Age: 52
LOS: 0 days | Discharge: HOME | End: 2022-10-15
Attending: HOSPITALIST
Payer: COMMERCIAL

## 2022-10-14 VITALS
BODY MASS INDEX: 32.14 KG/M2 | WEIGHT: 200 LBS | HEIGHT: 66 IN | DIASTOLIC BLOOD PRESSURE: 86 MMHG | SYSTOLIC BLOOD PRESSURE: 146 MMHG | HEART RATE: 120 BPM | TEMPERATURE: 96.9 F | RESPIRATION RATE: 16 BRPM

## 2022-10-14 VITALS — HEIGHT: 66 IN | WEIGHT: 199.96 LBS

## 2022-10-14 DIAGNOSIS — R60.0 LOCALIZED EDEMA: ICD-10-CM

## 2022-10-14 DIAGNOSIS — Z98.890 OTHER SPECIFIED POSTPROCEDURAL STATES: Chronic | ICD-10-CM

## 2022-10-14 DIAGNOSIS — Z91.040 LATEX ALLERGY STATUS: ICD-10-CM

## 2022-10-14 DIAGNOSIS — Z90.13 ACQUIRED ABSENCE OF BILATERAL BREASTS AND NIPPLES: Chronic | ICD-10-CM

## 2022-10-14 DIAGNOSIS — R60.9 EDEMA, UNSPECIFIED: ICD-10-CM

## 2022-10-14 DIAGNOSIS — Z85.3 PERSONAL HISTORY OF MALIGNANT NEOPLASM OF BREAST: ICD-10-CM

## 2022-10-14 DIAGNOSIS — Z90.13 ACQUIRED ABSENCE OF BILATERAL BREASTS AND NIPPLES: ICD-10-CM

## 2022-10-14 PROCEDURE — 46220 EXCISE ANAL EXT TAG/PAPILLA: CPT

## 2022-10-14 PROCEDURE — 85610 PROTHROMBIN TIME: CPT

## 2022-10-14 PROCEDURE — 85025 COMPLETE CBC W/AUTO DIFF WBC: CPT

## 2022-10-14 PROCEDURE — 93971 EXTREMITY STUDY: CPT | Mod: RT

## 2022-10-14 PROCEDURE — 99284 EMERGENCY DEPT VISIT MOD MDM: CPT | Mod: 25

## 2022-10-14 PROCEDURE — 80053 COMPREHEN METABOLIC PANEL: CPT

## 2022-10-14 PROCEDURE — 99285 EMERGENCY DEPT VISIT HI MDM: CPT

## 2022-10-14 PROCEDURE — 36415 COLL VENOUS BLD VENIPUNCTURE: CPT

## 2022-10-14 PROCEDURE — 85730 THROMBOPLASTIN TIME PARTIAL: CPT

## 2022-10-14 PROCEDURE — 99214 OFFICE O/P EST MOD 30 MIN: CPT | Mod: 25

## 2022-10-14 NOTE — ASSESSMENT
[FreeTextEntry1] : Ms. Thomas presents to the office with perianal itching swelling and occasional bleeding.  MAVERICK and anoscopy in office today revealed moderate pruritus ani along the skin of the postanal space as well as at the level of an anterior anal skin tag.  Her internal hemorrhoids were grade 2 and otherwise unremarkable.  On further questioning, she does admit to excess wiping of perianal skin during toileting and also admits to awakening at night with perianal itching.  I have recommended she limit anorectal hygiene for the next 2 weeks to rinsing with warm water and patting dry before applying clobetasol twice daily.  In addition, I have advised her to use Calmoseptine at night and liberally for symptomatic relief.  Healing of the perianal skin should become apparent by 2 weeks and then she can return to the office at approximately 3 weeks for bedside excision of her anterior anal skin tag.  She is agreeable with this plan of care and all questions were answered to her satisfaction.\par \par 10/14/22 Ms. Palmer presents to the office for excision of an anterior anal skin tag. This was performed at the office bedside. The operative site was prepped with Betadine, marked and anesthetized with 1% lidocaine to good effect. Sharp scissors was used to perform the excision and Monsell's was then utilized for hemostasis.\par Patient was advised to expect post procedure pain for at least one week's time. During this period, she is to avoid significant amount of straining or activity that could result in excess tissue swelling and lead to a recurrence of the external hemorrhoid/skin tag. She was also advised to expect post procedure drainage of the wound as it heals in by secondary intention. Cotton gauze should be placed within the undergarments to prevent soilage, and sitz bath should be performed 2-3 times daily to keep the site wound site clean and facilitate healing.\par

## 2022-10-14 NOTE — HISTORY OF PRESENT ILLNESS
[FreeTextEntry1] : Ms. Thomas presents to the office for consultation secondary to a chronic history of perianal irritation, occasional bleeding and discomfort.  She attributes her symptoms to her internal and external hemorrhoids.  Bowel movements are passed on a daily basis and typically without straining.  Last colonoscopy was noted to be 1 year prior.\par \par 10/14/22 Ms. Thomas returns to the office for followup of her anterior anal skin tag. She is here to have the site excised.

## 2022-10-14 NOTE — PHYSICAL EXAM
[Normal rectal exam] : exam was normal [Excoriation] : excoriations [Reduce Spontaneously] : a spontaneously reducible (grade II) [Skin Tags] : residual hemorrhoidal skin tags were noted [Normal] : was normal [None] : there was no rectal mass  [No Rash or Lesion] : No rash or lesion [Alert] : alert [Oriented to Person] : oriented to person [Oriented to Place] : oriented to place [Oriented to Time] : oriented to time [Calm] : calm [Gross Blood] : no gross blood [de-identified] : anterior  [de-identified] : No apparent distress [de-identified] : Normocephalic atraumatic [de-identified] : Moving all extremities x4

## 2022-10-14 NOTE — ED ADULT TRIAGE NOTE - CHIEF COMPLAINT QUOTE
Pt presents to the ED with c/o right arm pain, swelling and tingling. Hx of right axillary node dissection. Was seen at  and sent here for further evaluation. Neuro WDL. Denies chest pain, shortness of breath, difficulty breathing.

## 2022-10-15 VITALS
TEMPERATURE: 99 F | RESPIRATION RATE: 17 BRPM | HEART RATE: 95 BPM | OXYGEN SATURATION: 89 % | DIASTOLIC BLOOD PRESSURE: 89 MMHG | SYSTOLIC BLOOD PRESSURE: 156 MMHG

## 2022-10-15 LAB
ALBUMIN SERPL ELPH-MCNC: 3.9 G/DL — SIGNIFICANT CHANGE UP (ref 3.3–5)
ALP SERPL-CCNC: 138 U/L — HIGH (ref 40–120)
ALT FLD-CCNC: 21 U/L — SIGNIFICANT CHANGE UP (ref 12–78)
ANION GAP SERPL CALC-SCNC: 6 MMOL/L — SIGNIFICANT CHANGE UP (ref 5–17)
AST SERPL-CCNC: 17 U/L — SIGNIFICANT CHANGE UP (ref 15–37)
BASOPHILS # BLD AUTO: 0.03 K/UL — SIGNIFICANT CHANGE UP (ref 0–0.2)
BASOPHILS NFR BLD AUTO: 0.4 % — SIGNIFICANT CHANGE UP (ref 0–2)
BILIRUB SERPL-MCNC: 0.3 MG/DL — SIGNIFICANT CHANGE UP (ref 0.2–1.2)
BUN SERPL-MCNC: 17 MG/DL — SIGNIFICANT CHANGE UP (ref 7–23)
CALCIUM SERPL-MCNC: 8.9 MG/DL — SIGNIFICANT CHANGE UP (ref 8.5–10.1)
CHLORIDE SERPL-SCNC: 105 MMOL/L — SIGNIFICANT CHANGE UP (ref 96–108)
CO2 SERPL-SCNC: 27 MMOL/L — SIGNIFICANT CHANGE UP (ref 22–31)
CREAT SERPL-MCNC: 0.99 MG/DL — SIGNIFICANT CHANGE UP (ref 0.5–1.3)
EGFR: 69 ML/MIN/1.73M2 — SIGNIFICANT CHANGE UP
EOSINOPHIL # BLD AUTO: 0.11 K/UL — SIGNIFICANT CHANGE UP (ref 0–0.5)
EOSINOPHIL NFR BLD AUTO: 1.5 % — SIGNIFICANT CHANGE UP (ref 0–6)
GLUCOSE SERPL-MCNC: 104 MG/DL — HIGH (ref 70–99)
HCT VFR BLD CALC: 37.1 % — SIGNIFICANT CHANGE UP (ref 34.5–45)
HGB BLD-MCNC: 12.3 G/DL — SIGNIFICANT CHANGE UP (ref 11.5–15.5)
IMM GRANULOCYTES NFR BLD AUTO: 0.1 % — SIGNIFICANT CHANGE UP (ref 0–0.9)
LYMPHOCYTES # BLD AUTO: 1.15 K/UL — SIGNIFICANT CHANGE UP (ref 1–3.3)
LYMPHOCYTES # BLD AUTO: 15.9 % — SIGNIFICANT CHANGE UP (ref 13–44)
MCHC RBC-ENTMCNC: 29.1 PG — SIGNIFICANT CHANGE UP (ref 27–34)
MCHC RBC-ENTMCNC: 33.2 GM/DL — SIGNIFICANT CHANGE UP (ref 32–36)
MCV RBC AUTO: 87.9 FL — SIGNIFICANT CHANGE UP (ref 80–100)
MONOCYTES # BLD AUTO: 0.69 K/UL — SIGNIFICANT CHANGE UP (ref 0–0.9)
MONOCYTES NFR BLD AUTO: 9.5 % — SIGNIFICANT CHANGE UP (ref 2–14)
NEUTROPHILS # BLD AUTO: 5.24 K/UL — SIGNIFICANT CHANGE UP (ref 1.8–7.4)
NEUTROPHILS NFR BLD AUTO: 72.6 % — SIGNIFICANT CHANGE UP (ref 43–77)
PLATELET # BLD AUTO: 219 K/UL — SIGNIFICANT CHANGE UP (ref 150–400)
POTASSIUM SERPL-MCNC: 4 MMOL/L — SIGNIFICANT CHANGE UP (ref 3.5–5.3)
POTASSIUM SERPL-SCNC: 4 MMOL/L — SIGNIFICANT CHANGE UP (ref 3.5–5.3)
PROT SERPL-MCNC: 7.7 GM/DL — SIGNIFICANT CHANGE UP (ref 6–8.3)
RBC # BLD: 4.22 M/UL — SIGNIFICANT CHANGE UP (ref 3.8–5.2)
RBC # FLD: 12.9 % — SIGNIFICANT CHANGE UP (ref 10.3–14.5)
SODIUM SERPL-SCNC: 138 MMOL/L — SIGNIFICANT CHANGE UP (ref 135–145)
WBC # BLD: 7.23 K/UL — SIGNIFICANT CHANGE UP (ref 3.8–10.5)
WBC # FLD AUTO: 7.23 K/UL — SIGNIFICANT CHANGE UP (ref 3.8–10.5)

## 2022-10-15 PROCEDURE — 93971 EXTREMITY STUDY: CPT | Mod: 26,RT

## 2022-10-15 NOTE — ED PROVIDER NOTE - PATIENT PORTAL LINK FT
You can access the FollowMyHealth Patient Portal offered by [facility] by registering at the following website: [email or web address]. By joining [facility]’s FollowMyHealth portal, you will also be able to view your health information using other applications (apps) compatible with our system.

## 2022-10-15 NOTE — ED ADULT NURSE NOTE - NSFALLRSKASSESSTYPE_ED_ALL_ED
Brief GI Note  06/08/18    Note G tube consult. Discussed case with GI staff, Dr. Calderon. Note ongoing O2 requirements of 4L and anti-coagulation for LVAD + ASA.     Patient has complex anatomy with LVAD that may make endoscopic G tube placement technically difficult, if even impossible to do.     Recommend NJ tube feeds over the weekend with re-evaluation on Monday for possibility of PEG tube placement in OR next week (unclear when OR time available). This would requiring patient's anti-coagulation for LVAD to be held; question feasibility of this.     GI will follow peripherally over the weekend.     Beklis Quintana MD  Gastroenterology Fellow   Initial (On Arrival)

## 2022-10-15 NOTE — ED ADULT NURSE NOTE - OBJECTIVE STATEMENT
patient presents to the ER tonight for right arm pain as well as associated paresthesias. patient states she is worried for blood clot in that arm. has full ROM, no strength changes noted. pink band applied to RUE; 22 gauge PIV established in left hand with good blood return and flushing

## 2022-10-15 NOTE — ED PROVIDER NOTE - PHYSICAL EXAMINATION
***GEN - NAD; well appearing; A+O x3 ***HEAD - NC/AT ***EYES/NOSE - PERRL, EOMI, mucous membranes moist, no discharge ***THROAT: Oral cavity and pharynx normal. No inflammation, swelling, exudate, or lesions.  ***NECK: Neck supple  ***PULMONARY - CTA b/l, symmetric breath sounds. ***CARDIAC -s1s2, RRR, no M,G,R  ***ABDOMEN - +BS, ND, NT, soft, no guarding, no rebound, no masses   ***BACK - no CVA tenderness, Normal  spine ***EXTREMITIES - symmetric pulses, 2+ dp, capillary refill < 2 seconds, no clubbing, no cyanosis, + RUE edema, 2+ localized around proximal forearm/elbow.  ***SKIN - no rash or bruising   ***NEUROLOGIC - alert, CN 2-12 intact, reflexes nl, sensation nl, coordination nl, finger to nose nl

## 2022-10-15 NOTE — ED PROVIDER NOTE - MUSCULOSKELETAL NEGATIVE STATEMENT, MLM
+ swelling/edema of RUE, mostly around elbow area, no back pain, no gout, no musculoskeletal pain, no neck pain, and no weakness.

## 2022-10-15 NOTE — ED PROVIDER NOTE - NSICDXPASTSURGICALHX_GEN_ALL_CORE_FT
PAST SURGICAL HISTORY:  H/O bilateral mastectomy     S/P bilateral breast reduction 2007    S/P TRAM (transverse rectus abdominis muscle) flap breast reconstruction     
Statement Selected

## 2022-10-18 ENCOUNTER — APPOINTMENT (OUTPATIENT)
Dept: BREAST CENTER | Facility: CLINIC | Age: 52
End: 2022-10-18

## 2022-10-18 VITALS
SYSTOLIC BLOOD PRESSURE: 160 MMHG | BODY MASS INDEX: 32.14 KG/M2 | HEART RATE: 90 BPM | DIASTOLIC BLOOD PRESSURE: 95 MMHG | HEIGHT: 66 IN | WEIGHT: 200 LBS

## 2022-10-18 PROCEDURE — 99213 OFFICE O/P EST LOW 20 MIN: CPT

## 2022-10-18 NOTE — HISTORY OF PRESENT ILLNESS
[FreeTextEntry1] : 51 year old female underwent bilateral nipple sparing mastectomies with JASMYNE flaps, right axillary sentinel node biopsy and completion axillary node dissection on 2/13/19 with Dr. Campbell/Katty.  She subsequently received adjuvant chemotherapy followed by radiation therapy to the right breast for stage IIIC breast cancer ( T1a, N3, ER+, NJ +, HER2 negative).   She is on Arimidex.\par \par She called 3 days ago that she had developed right upper arm swelling.  She had gone to the ER and there was no DVT.  She thought there was slight redness so she was started on Keflex.  It is getting better.\par \par

## 2022-10-18 NOTE — DATA REVIEWED
[FreeTextEntry1] : SURGICAL PATHOLOGY: 2/13/19\par Left mastectomy - no suspicious findings.\par Left retronipple tissue - negative.\par Right mastectomy - 5 mm invasive ductal carcinoma.  Extensive DCIS.\par R sentinel node  # 1 -  1 cm metastasis . Extracapsular  extension present. \par Nonsentinel node # 1 -  metastatic carcinoma with extracapsular extension. \par Right retronipple tissue - negative. Extracapsular extension present.\par Right axillary contents - Metastatic carcinoma to 8 of 11 nodes.  \par Right Internal mammary lymph node - negative.\par [id number]  Clinical stage IIIB

## 2022-10-18 NOTE — PHYSICAL EXAM
[Sclera nonicteric] : sclera nonicteric [Conjunctiva pink] : conjunctiva pink [Supple] : supple [No Supraclavicular Adenopathy] : no supraclavicular adenopathy [No Cervical Adenopathy] : no cervical adenopathy [No Thyromegaly] : no thyromegaly [Clear to Auscultation Bilat] : clear to auscultation bilaterally [Normal Sinus Rhythm] : normal sinus rhythm [No Gallops] : no gallops [No Rubs] : no pericardial rub [Examined in the supine and seated position] : examined in the supine and seated position [Asymmetrical] : asymmetrical [Grade 3] : Ptosis Grade 3 [No dominant masses] : no dominant masses in right breast  [No dominant masses] : no dominant masses left breast [No Nipple Retraction] : no left nipple retraction [No Nipple Discharge] : no left nipple discharge [No Axillary Lymphadenopathy] : no left axillary lymphadenopathy [Soft] : abdomen soft [Not Tender] : non-tender [No Edema] : no edema [No Rashes] : no rashes [No Ulceration] : no ulceration [de-identified] : Inframammary scar.  [de-identified] : L>R [de-identified] : Inframammary scar.   [de-identified] : Mild edema upper inner arm.  No erythema.  Slight scratch near 3rd knuckle (cat scratch)

## 2022-10-26 ENCOUNTER — OUTPATIENT (OUTPATIENT)
Dept: OUTPATIENT SERVICES | Facility: HOSPITAL | Age: 52
LOS: 1 days | Discharge: HOME | End: 2022-10-26

## 2022-10-26 DIAGNOSIS — Z98.890 OTHER SPECIFIED POSTPROCEDURAL STATES: Chronic | ICD-10-CM

## 2022-10-26 DIAGNOSIS — Z90.13 ACQUIRED ABSENCE OF BILATERAL BREASTS AND NIPPLES: Chronic | ICD-10-CM

## 2022-10-26 DIAGNOSIS — C50.911 MALIGNANT NEOPLASM OF UNSPECIFIED SITE OF RIGHT FEMALE BREAST: ICD-10-CM

## 2022-10-31 ENCOUNTER — APPOINTMENT (OUTPATIENT)
Dept: PHYSICAL MEDICINE AND REHAB | Facility: CLINIC | Age: 52
End: 2022-10-31

## 2022-10-31 VITALS
HEART RATE: 94 BPM | OXYGEN SATURATION: 98 % | RESPIRATION RATE: 18 BRPM | DIASTOLIC BLOOD PRESSURE: 99 MMHG | TEMPERATURE: 97.8 F | SYSTOLIC BLOOD PRESSURE: 150 MMHG

## 2022-10-31 PROCEDURE — 99204 OFFICE O/P NEW MOD 45 MIN: CPT

## 2022-10-31 NOTE — ASSESSMENT
[FreeTextEntry1] : 51 year old female presenting for evaluation.\par \par #RUE edema:\par -Likely secondary to lymphedema given clinical picture, time frame and history of risk factors for lymphedema\par - No signs of infection currently\par -Breast surgery notes reviewed, patient with history of completion axillary node dissection and radiation therapy\par -US RUE reviewed, negative for DVT\par -Heme-onc notes reviewed, currently on Arimidex\par -MR breast denied by insurance, obtain US right axilla to rule out new lymphadenopathy\par -Educated patient on lymphedema \par -Start complete decongestive therapy\par -Plan to obtain compression garments\par \par Follow up in 4-6 weeks.

## 2022-10-31 NOTE — PHYSICAL EXAM
[FreeTextEntry1] : Gen: Patient is A&O x 3, NAD\par HEENT: EOMI, hearing grossly normal\par Resp: regular, non - labored\par CV: pulses regular\par Skin: no rashes, erythema\par Lymph: +mild pitting edema in RUE \par Inspection: no instability or misalignment\par ROM: full throughout\par Palpation:no tenderness to palpation, no axillary lymphadenopathy to palpation\par Sensation: intact to light touch\par Reflexes: 1+ and symmetric throughout\par Strength: 5/5 throughout\par Special tests: -Stemmer's sign\par Gait: normal, non-antalgic\par \par

## 2022-10-31 NOTE — HISTORY OF PRESENT ILLNESS
[FreeTextEntry1] : Ms. Thomas is a 51 year old female who underwent bilateral nipple sparing mastectomies with JASMYNE flaps, right axillary sentinel node biopsy and completion axillary node dissection on 2/13/19 with Dr. Campbell/Katty. She subsequently received adjuvant chemotherapy followed by radiation therapy to the right breast for stage IIIC breast cancer ( T1a, N3, ER+, ME +, HER2 negative). She is on Arimidex.\par \par She reports that she began to notice right upper extremity swelling approximately 2 weeks ago.  She denies any inciting events.  She did notice some redness at the time and was given a course of Keflex reports redness has resolved.  Had an ultrasound done which was negative for DVT.  Denies any overt pain.  Just feels some tightness in her right upper extremity.\par \par \par

## 2022-11-01 ENCOUNTER — APPOINTMENT (OUTPATIENT)
Dept: HEMATOLOGY ONCOLOGY | Facility: CLINIC | Age: 52
End: 2022-11-01

## 2022-11-09 ENCOUNTER — RESULT REVIEW (OUTPATIENT)
Age: 52
End: 2022-11-09

## 2022-11-09 ENCOUNTER — OUTPATIENT (OUTPATIENT)
Dept: OUTPATIENT SERVICES | Facility: HOSPITAL | Age: 52
LOS: 1 days | End: 2022-11-09
Payer: COMMERCIAL

## 2022-11-09 ENCOUNTER — APPOINTMENT (OUTPATIENT)
Dept: ULTRASOUND IMAGING | Facility: CLINIC | Age: 52
End: 2022-11-09

## 2022-11-09 DIAGNOSIS — Z98.890 OTHER SPECIFIED POSTPROCEDURAL STATES: Chronic | ICD-10-CM

## 2022-11-09 DIAGNOSIS — C50.911 MALIGNANT NEOPLASM OF UNSPECIFIED SITE OF RIGHT FEMALE BREAST: ICD-10-CM

## 2022-11-09 DIAGNOSIS — Z90.13 ACQUIRED ABSENCE OF BILATERAL BREASTS AND NIPPLES: Chronic | ICD-10-CM

## 2022-11-09 PROCEDURE — 76641 ULTRASOUND BREAST COMPLETE: CPT | Mod: 26,RT

## 2022-11-09 PROCEDURE — 76641 ULTRASOUND BREAST COMPLETE: CPT

## 2022-11-11 ENCOUNTER — NON-APPOINTMENT (OUTPATIENT)
Age: 52
End: 2022-11-11

## 2022-11-28 ENCOUNTER — APPOINTMENT (OUTPATIENT)
Dept: HEMATOLOGY ONCOLOGY | Facility: CLINIC | Age: 52
End: 2022-11-28

## 2022-11-30 ENCOUNTER — RX RENEWAL (OUTPATIENT)
Age: 52
End: 2022-11-30

## 2022-12-12 ENCOUNTER — APPOINTMENT (OUTPATIENT)
Dept: PHYSICAL MEDICINE AND REHAB | Facility: CLINIC | Age: 52
End: 2022-12-12

## 2023-01-28 ENCOUNTER — OUTPATIENT (OUTPATIENT)
Dept: OUTPATIENT SERVICES | Facility: HOSPITAL | Age: 53
LOS: 1 days | Discharge: HOME | End: 2023-01-28

## 2023-01-28 DIAGNOSIS — C50.911 MALIGNANT NEOPLASM OF UNSPECIFIED SITE OF RIGHT FEMALE BREAST: ICD-10-CM

## 2023-01-28 DIAGNOSIS — Z98.890 OTHER SPECIFIED POSTPROCEDURAL STATES: Chronic | ICD-10-CM

## 2023-01-28 DIAGNOSIS — Z90.13 ACQUIRED ABSENCE OF BILATERAL BREASTS AND NIPPLES: Chronic | ICD-10-CM

## 2023-02-03 PROBLEM — K64.4 RESIDUAL HEMORRHOIDAL SKIN TAGS: Status: RESOLVED | Noted: 2022-09-19 | Resolved: 2023-02-03

## 2023-02-06 ENCOUNTER — APPOINTMENT (OUTPATIENT)
Dept: HEMATOLOGY ONCOLOGY | Facility: CLINIC | Age: 53
End: 2023-02-06
Payer: COMMERCIAL

## 2023-02-06 VITALS
DIASTOLIC BLOOD PRESSURE: 95 MMHG | TEMPERATURE: 97.8 F | RESPIRATION RATE: 18 BRPM | OXYGEN SATURATION: 100 % | HEART RATE: 94 BPM | SYSTOLIC BLOOD PRESSURE: 146 MMHG

## 2023-02-06 DIAGNOSIS — K64.4 RESIDUAL HEMORRHOIDAL SKIN TAGS: ICD-10-CM

## 2023-02-06 DIAGNOSIS — I89.0 LYMPHEDEMA, NOT ELSEWHERE CLASSIFIED: ICD-10-CM

## 2023-02-06 DIAGNOSIS — M85.80 OTHER SPECIFIED DISORDERS OF BONE DENSITY AND STRUCTURE, UNSPECIFIED SITE: ICD-10-CM

## 2023-02-06 PROCEDURE — 99213 OFFICE O/P EST LOW 20 MIN: CPT

## 2023-02-06 RX ORDER — MOMETASONE FUROATE 1 MG/G
0.1 CREAM TOPICAL
Qty: 45 | Refills: 0 | Status: DISCONTINUED | COMMUNITY
Start: 2022-01-12 | End: 2023-02-06

## 2023-02-06 RX ORDER — CEPHALEXIN 500 MG/1
500 CAPSULE ORAL
Qty: 28 | Refills: 0 | Status: DISCONTINUED | COMMUNITY
Start: 2022-10-15 | End: 2023-02-06

## 2023-02-06 RX ORDER — OXYCODONE AND ACETAMINOPHEN 2.5; 325 MG/1; MG/1
2.5-325 TABLET ORAL EVERY 6 HOURS
Qty: 10 | Refills: 0 | Status: DISCONTINUED | COMMUNITY
Start: 2022-10-14 | End: 2023-02-06

## 2023-02-06 NOTE — ASSESSMENT
[FreeTextEntry1] : Patient is a 52 y.o. with an ER+, HER2-, genetic testing neg, right sided stage IIIC ductal breast cancer, s/p B/L mastectomies, JASMYNE reconstruction, adjuvant chemotherapy with DDACT, XRT, and started Arimidex 9/2019.  \par She is tolerating this well and clinically remains without evidence of disease.  \par Plan - Continue AI x 10 years, or until 9/2029. \par \par Continue with surveillance:\par Mammo/Sono: N/A - Sono 11/9/22 BiRADS 1 - neg.  Patient sees Dr. Jody Dale. \par GYN:  Saw 9/2022  - goes yearly. \par Bone Density: 11/9/20 - Osteopenia with T-score spine -2.1.  Was instructed to continue with Calcium and Vitamin D and exercise. Repeat in 11/2022.   Aware to repeat now - given Rx.  \par If worse will need to consider medication. She asked about possible side effects - briefly reviewed. \par Patient seeing dentist next week - given dental clearance form to fill out.  \par Colonoscopy: 9/21/21 (MD at [facility]) - hyperplastic polyp in rectum - was instructed to repeat in 1 - 2 years. \par \par PE in 1 year (patient request - will alternate Q6 months with breast surgeon). \par \par Shwetha WEST ([facility]) \par Holly Ken\par Kierra Ribera -> Jody Hayward\par Gabbie Faust - changer to Mckayla Willis\par Dr. Shania Soares (GI - [facility])\par

## 2023-02-06 NOTE — RESULTS/DATA
[FreeTextEntry1] : 10/15/22 - WBC: 7.23  Hgb: 12.3  Hct: 37.1  MCV:  87.9  Plts: 219\par                  CMP: significant for an Alk phos of 138 (chronically elevated), nonfasting glucose of 104

## 2023-02-06 NOTE — PHYSICAL EXAM
[Obese] : obese [Normal] : normal spine exam without palpable tenderness, no kyphosis or scoliosis [de-identified] : anicteric [de-identified] : no lymphadenopathy [de-identified] : no c/c/e [de-identified] : b/l mastectomies with JASMYNE reconstruction, scars well healed.  Right breast with some firmness.  No masses, nodules.  No axillary lymphadenopathy. [de-identified] : wearing right arm lymphedema sleeve - no edema appreciated.  [de-identified] : no rashes

## 2023-03-07 ENCOUNTER — APPOINTMENT (OUTPATIENT)
Dept: BREAST CENTER | Facility: CLINIC | Age: 53
End: 2023-03-07

## 2023-03-07 RX ORDER — CLOBETASOL PROPIONATE 0.5 MG/G
0.05 CREAM TOPICAL TWICE DAILY
Qty: 30 | Refills: 3 | Status: ACTIVE | COMMUNITY
Start: 2023-03-07 | End: 1900-01-01

## 2023-03-10 NOTE — ED PROVIDER NOTE - OBJECTIVE STATEMENT
51-year-old female with history of breast cancer status post bilateral mastectomy and right-sided lymph node dissection presents with right upper extremity edema.  Patient states she has only noticed swelling for the past 1 day.  Mostly collecting around the inner elbow area.  No fevers no numbness or tingling
98

## 2023-05-01 ENCOUNTER — APPOINTMENT (OUTPATIENT)
Dept: RADIOLOGY | Facility: CLINIC | Age: 53
End: 2023-05-01
Payer: COMMERCIAL

## 2023-05-01 ENCOUNTER — OUTPATIENT (OUTPATIENT)
Dept: OUTPATIENT SERVICES | Facility: HOSPITAL | Age: 53
LOS: 1 days | End: 2023-05-01
Payer: COMMERCIAL

## 2023-05-01 DIAGNOSIS — Z98.890 OTHER SPECIFIED POSTPROCEDURAL STATES: Chronic | ICD-10-CM

## 2023-05-01 DIAGNOSIS — Z90.13 ACQUIRED ABSENCE OF BILATERAL BREASTS AND NIPPLES: Chronic | ICD-10-CM

## 2023-05-01 DIAGNOSIS — M85.80 OTHER SPECIFIED DISORDERS OF BONE DENSITY AND STRUCTURE, UNSPECIFIED SITE: ICD-10-CM

## 2023-05-01 PROCEDURE — 77085 DXA BONE DENSITY AXL VRT FX: CPT | Mod: 26

## 2023-05-01 PROCEDURE — 77085 DXA BONE DENSITY AXL VRT FX: CPT

## 2023-06-28 ENCOUNTER — APPOINTMENT (OUTPATIENT)
Dept: SURGERY | Facility: CLINIC | Age: 53
End: 2023-06-28

## 2023-08-30 NOTE — PROCEDURE
[FreeTextEntry1] : Anoscopy performed after pt consent was obtained. Circumferential visualization of the anal canal  above the level of the dentate line was completed.\par  2 = A lot of assistance

## 2023-09-15 ENCOUNTER — RESULT REVIEW (OUTPATIENT)
Age: 53
End: 2023-09-15

## 2023-09-19 ENCOUNTER — APPOINTMENT (OUTPATIENT)
Dept: CT IMAGING | Facility: CLINIC | Age: 53
End: 2023-09-19

## 2023-09-22 ENCOUNTER — APPOINTMENT (OUTPATIENT)
Dept: CT IMAGING | Facility: CLINIC | Age: 53
End: 2023-09-22
Payer: COMMERCIAL

## 2023-09-22 ENCOUNTER — OUTPATIENT (OUTPATIENT)
Dept: OUTPATIENT SERVICES | Facility: HOSPITAL | Age: 53
LOS: 1 days | End: 2023-09-22
Payer: COMMERCIAL

## 2023-09-22 DIAGNOSIS — Z98.890 OTHER SPECIFIED POSTPROCEDURAL STATES: Chronic | ICD-10-CM

## 2023-09-22 DIAGNOSIS — K63.89 OTHER SPECIFIED DISEASES OF INTESTINE: ICD-10-CM

## 2023-09-22 PROCEDURE — 74177 CT ABD & PELVIS W/CONTRAST: CPT

## 2023-09-22 PROCEDURE — 74177 CT ABD & PELVIS W/CONTRAST: CPT | Mod: 26

## 2023-09-27 ENCOUNTER — APPOINTMENT (OUTPATIENT)
Dept: GASTROENTEROLOGY | Facility: AMBULATORY MEDICAL SERVICES | Age: 53
End: 2023-09-27
Payer: COMMERCIAL

## 2023-09-27 PROCEDURE — 43239 EGD BIOPSY SINGLE/MULTIPLE: CPT

## 2023-09-27 PROCEDURE — 45378 DIAGNOSTIC COLONOSCOPY: CPT

## 2023-10-01 PROBLEM — Z92.3 HISTORY OF RADIATION THERAPY: Status: RESOLVED | Noted: 2020-07-06 | Resolved: 2023-10-01

## 2024-02-14 ENCOUNTER — OUTPATIENT (OUTPATIENT)
Dept: OUTPATIENT SERVICES | Facility: HOSPITAL | Age: 54
LOS: 1 days | Discharge: HOME | End: 2024-02-14

## 2024-02-14 DIAGNOSIS — Z98.890 OTHER SPECIFIED POSTPROCEDURAL STATES: Chronic | ICD-10-CM

## 2024-02-14 DIAGNOSIS — C50.911 MALIGNANT NEOPLASM OF UNSPECIFIED SITE OF RIGHT FEMALE BREAST: ICD-10-CM

## 2024-02-14 DIAGNOSIS — Z90.13 ACQUIRED ABSENCE OF BILATERAL BREASTS AND NIPPLES: Chronic | ICD-10-CM

## 2024-02-15 ENCOUNTER — RESULT REVIEW (OUTPATIENT)
Age: 54
End: 2024-02-15

## 2024-02-15 ENCOUNTER — APPOINTMENT (OUTPATIENT)
Dept: HEMATOLOGY ONCOLOGY | Facility: CLINIC | Age: 54
End: 2024-02-15
Payer: COMMERCIAL

## 2024-02-15 VITALS
HEART RATE: 93 BPM | OXYGEN SATURATION: 99 % | BODY MASS INDEX: 28.93 KG/M2 | SYSTOLIC BLOOD PRESSURE: 121 MMHG | WEIGHT: 180 LBS | TEMPERATURE: 97.7 F | HEIGHT: 66 IN | DIASTOLIC BLOOD PRESSURE: 78 MMHG

## 2024-02-15 DIAGNOSIS — M81.8 OTHER OSTEOPOROSIS W/OUT CURRENT PATHOLOGICAL FRACTURE: ICD-10-CM

## 2024-02-15 DIAGNOSIS — C50.911 MALIGNANT NEOPLASM OF UNSPECIFIED SITE OF RIGHT FEMALE BREAST: ICD-10-CM

## 2024-02-15 DIAGNOSIS — Z86.2 PERSONAL HISTORY OF DISEASES OF THE BLOOD AND BLOOD-FORMING ORGANS AND CERTAIN DISORDERS INVOLVING THE IMMUNE MECHANISM: ICD-10-CM

## 2024-02-15 DIAGNOSIS — Z79.899 ENCOUNTER FOR THERAPEUTIC DRUG LVL MONITORING: ICD-10-CM

## 2024-02-15 DIAGNOSIS — L29.0 PRURITUS ANI: ICD-10-CM

## 2024-02-15 DIAGNOSIS — D64.9 ANEMIA, UNSPECIFIED: ICD-10-CM

## 2024-02-15 DIAGNOSIS — Z08 ENCOUNTER FOR FOLLOW-UP EXAMINATION AFTER COMPLETED TREATMENT FOR MALIGNANT NEOPLASM: ICD-10-CM

## 2024-02-15 DIAGNOSIS — Z30.9 ENCOUNTER FOR CONTRACEPTIVE MANAGEMENT, UNSPECIFIED: ICD-10-CM

## 2024-02-15 DIAGNOSIS — Z85.3 ENCOUNTER FOR FOLLOW-UP EXAMINATION AFTER COMPLETED TREATMENT FOR MALIGNANT NEOPLASM: ICD-10-CM

## 2024-02-15 DIAGNOSIS — Z51.81 ENCOUNTER FOR THERAPEUTIC DRUG LVL MONITORING: ICD-10-CM

## 2024-02-15 DIAGNOSIS — C77.3 MALIGNANT NEOPLASM OF UNSPECIFIED SITE OF RIGHT FEMALE BREAST: ICD-10-CM

## 2024-02-15 DIAGNOSIS — Z87.898 PERSONAL HISTORY OF OTHER SPECIFIED CONDITIONS: ICD-10-CM

## 2024-02-15 LAB
BASOPHILS # BLD AUTO: 0.04 K/UL — SIGNIFICANT CHANGE UP (ref 0–0.2)
BASOPHILS NFR BLD AUTO: 0.5 % — SIGNIFICANT CHANGE UP (ref 0–2)
EOSINOPHIL # BLD AUTO: 0.1 K/UL — SIGNIFICANT CHANGE UP (ref 0–0.5)
EOSINOPHIL NFR BLD AUTO: 1.3 % — SIGNIFICANT CHANGE UP (ref 0–6)
HCT VFR BLD CALC: 40.1 % — SIGNIFICANT CHANGE UP (ref 34.5–45)
HGB BLD-MCNC: 13.7 G/DL — SIGNIFICANT CHANGE UP (ref 11.5–15.5)
IMM GRANULOCYTES NFR BLD AUTO: 0.3 % — SIGNIFICANT CHANGE UP (ref 0–0.9)
LYMPHOCYTES # BLD AUTO: 1.15 K/UL — SIGNIFICANT CHANGE UP (ref 1–3.3)
LYMPHOCYTES # BLD AUTO: 14.5 % — SIGNIFICANT CHANGE UP (ref 13–44)
MCHC RBC-ENTMCNC: 29.8 PG — SIGNIFICANT CHANGE UP (ref 27–34)
MCHC RBC-ENTMCNC: 34.2 GM/DL — SIGNIFICANT CHANGE UP (ref 32–36)
MCV RBC AUTO: 87.4 FL — SIGNIFICANT CHANGE UP (ref 80–100)
MONOCYTES # BLD AUTO: 0.73 K/UL — SIGNIFICANT CHANGE UP (ref 0–0.9)
MONOCYTES NFR BLD AUTO: 9.2 % — SIGNIFICANT CHANGE UP (ref 2–14)
NEUTROPHILS # BLD AUTO: 5.87 K/UL — SIGNIFICANT CHANGE UP (ref 1.8–7.4)
NEUTROPHILS NFR BLD AUTO: 74.2 % — SIGNIFICANT CHANGE UP (ref 43–77)
NRBC # BLD: 0 /100 WBCS — SIGNIFICANT CHANGE UP (ref 0–0)
PLATELET # BLD AUTO: 250 K/UL — SIGNIFICANT CHANGE UP (ref 150–400)
RBC # BLD: 4.59 M/UL — SIGNIFICANT CHANGE UP (ref 3.8–5.2)
RBC # FLD: 13.4 % — SIGNIFICANT CHANGE UP (ref 10.3–14.5)
WBC # BLD: 7.91 K/UL — SIGNIFICANT CHANGE UP (ref 3.8–10.5)
WBC # FLD AUTO: 7.91 K/UL — SIGNIFICANT CHANGE UP (ref 3.8–10.5)

## 2024-02-15 PROCEDURE — 99214 OFFICE O/P EST MOD 30 MIN: CPT

## 2024-02-15 RX ORDER — ANORECTAL OINTMENT 15.7; .44; 24; 20.6 G/100G; G/100G; G/100G; G/100G
0.44-20.6 OINTMENT TOPICAL
Qty: 1 | Refills: 0 | Status: DISCONTINUED | COMMUNITY
Start: 2022-09-19 | End: 2024-02-15

## 2024-02-15 RX ORDER — CLOBETASOL PROPIONATE 0.5 MG/G
0.05 CREAM TOPICAL 3 TIMES DAILY
Qty: 1 | Refills: 0 | Status: DISCONTINUED | COMMUNITY
Start: 2022-09-19 | End: 2024-02-15

## 2024-02-15 RX ORDER — IBANDRONATE SODIUM 150 MG/1
150 TABLET ORAL
Qty: 1 | Refills: 3 | Status: ACTIVE | COMMUNITY
Start: 2024-02-15 | End: 1900-01-01

## 2024-02-15 RX ORDER — ANASTROZOLE TABLETS 1 MG/1
1 TABLET ORAL
Qty: 90 | Refills: 3 | Status: ACTIVE | COMMUNITY
Start: 2019-09-05 | End: 1900-01-01

## 2024-02-16 LAB
ERYTHROCYTE [SEDIMENTATION RATE] IN BLOOD: 38 MM/HR — HIGH (ref 0–20)
FERRITIN SERPL-MCNC: 28 NG/ML — SIGNIFICANT CHANGE UP (ref 13–330)
FOLATE SERPL-MCNC: >20 NG/ML — SIGNIFICANT CHANGE UP
IRON SATN MFR SERPL: 21 % — SIGNIFICANT CHANGE UP (ref 14–50)
IRON SATN MFR SERPL: 83 UG/DL — SIGNIFICANT CHANGE UP (ref 30–160)
TIBC SERPL-MCNC: 401 UG/DL — SIGNIFICANT CHANGE UP (ref 220–430)
UIBC SERPL-MCNC: 319 UG/DL — SIGNIFICANT CHANGE UP (ref 110–370)
VIT B12 SERPL-MCNC: 791 PG/ML — SIGNIFICANT CHANGE UP (ref 232–1245)

## 2024-02-20 ENCOUNTER — NON-APPOINTMENT (OUTPATIENT)
Age: 54
End: 2024-02-20

## 2024-02-21 PROBLEM — Z51.81 ENCOUNTER FOR MONITORING ADJUVANT HORMONAL THERAPY: Status: ACTIVE | Noted: 2019-10-30

## 2024-02-21 PROBLEM — M81.8 OTHER OSTEOPOROSIS: Status: ACTIVE | Noted: 2024-02-15

## 2024-02-21 PROBLEM — Z08 ENCOUNTER FOR FOLLOW-UP SURVEILLANCE OF BREAST CANCER: Status: ACTIVE | Noted: 2019-10-18

## 2024-02-21 PROBLEM — C50.911 CARCINOMA OF RIGHT BREAST METASTATIC TO AXILLARY LYMPH NODE: Status: ACTIVE | Noted: 2019-02-26

## 2024-02-21 PROBLEM — Z30.9 CONTRACEPTION MANAGEMENT: Status: RESOLVED | Noted: 2019-01-14 | Resolved: 2024-02-21

## 2024-02-21 RX ORDER — TIRZEPATIDE 12.5 MG/.5ML
12.5 INJECTION, SOLUTION SUBCUTANEOUS
Refills: 0 | Status: ACTIVE | COMMUNITY

## 2024-02-21 RX ORDER — ALCOHOL ANTISEPTIC PADS
70 PADS, MEDICATED (EA) TOPICAL
Refills: 0 | Status: ACTIVE | COMMUNITY

## 2024-02-21 NOTE — REVIEW OF SYSTEMS
[Patient Intake Form Reviewed] : Patient intake form was reviewed [Negative] : Allergic/Immunologic [Joint Stiffness] : joint stiffness [FreeTextEntry2] : weight gain

## 2024-02-21 NOTE — HISTORY OF PRESENT ILLNESS
[Disease: _____________________] : Disease: [unfilled] [T: ___] : T[unfilled] [N: ___] : N[unfilled] [AJCC Stage: ____] : AJCC Stage: [unfilled] [de-identified] : MARIANA (RENETTAJimy LINDA is a 53 y.o. with a PMH significant for GERD, and osteopenia, who we are following for a hx of an ER+, HER2-, right sided stage IIIC ductal breast cancer.    11/5/18 - Presented at age 47 (COLOR negative, was felt to be postmenopausal) with an abnormality on routine mammo - Right breast UOQ. 1/3/19 - Right breast biopsy - Moderately diff IDC (6/9) and DCIS, ER 95%, UT 95%, HER2- 1/10/19 - MRI - 5.5 x 2.2cm area of irregular enhancement right outer breast.  No lymphadenopathy.  9mm lesion in the liver.  2/13/19 - B/L nipple sparing mastectomy with JASMYNE reconstruction - 0.5cm mod diff IDC with DCIS, cribriform, intermediate nuclear grade with focal necrosis.  Right SLN with a 1cm met and TUAN was present.  13 additional LN's were removed for a total of 14.  10/14 LN's +.  Receptors repeated on LN - ER 95%, UT 95%, HER2- pT1a, pN3 = IIIC.  PET/CT denied by insurance company.  2/23/19 - CT Chest - 3mm RUL nodular changes stable from 2014.  SUN calcified granuloma.   She had CT Angio A/P prior to surgery - 1.8cm posterior hepatic cyst in right lobe and area of concern on MRI was a left lobe cyst.  Small umbilical hernia.  2/15/19 - Bone scan - negative.   3/26/19 - 7/2/19 - Adjuvant chemotherapy with DDAC-DDT.    8/6/19 - 9/11/19 - XRT to right chest wall and right supraclav/axilla  9/2019 - Started on Arimidex.  Fall 2022 - Diagnosed with RUE lymphedema. States originally went to ER as was not sure what was happening.  10/15/22 - RUE Doppler - neg.  Saw Dr. Jody Dale - MRI ordered but was denied by insurance.  11/9/22 - Right breast Sono - BiRADS 1 - neg Referred to Dr. Shirley - had PT at [place].  Now wearing sleeve. Feels has improved.  [de-identified] : ER 95%, OH 95%, HER2- (both biopsy and repeated on LN) [de-identified] : mod diff IDC with DCIS, cribriform, intermediate nuclear grade with focal necrosis [de-identified] : 1/22/19 - COLOR - negative [de-identified] : Patient reports she is feeling well.  She did have routine labs done by PCP and was told she had low irons.  Because of this she had an EGD and colonoscopy 9/2023 (Dr. Chambers) - Internal hemorrhoids, hiatal hernia, and erythema in the stomach c/w non-erosive esophagitis.  On Omeprazole.  Also had a bone density 5/1/23 - Osteoporosis with T-score spine -2.7 (prior -2.1 11/2020). Denies any other changes in her family, medical, or social history since her last visit of 2/6/23.

## 2024-02-21 NOTE — PHYSICAL EXAM
[Obese] : obese [Normal] : affect appropriate [de-identified] : anicteric [de-identified] : no lymphadenopathy [de-identified] : no c/c/e [de-identified] : b/l mastectomies with JASMYNE reconstruction, scars well healed.   No masses, nodules.  No axillary lymphadenopathy. [de-identified] : Right arm - no edema appreciated.  [de-identified] : no rashes

## 2024-02-21 NOTE — ASSESSMENT
[FreeTextEntry1] : Patient is a 53 y.o. with an ER+, HER2-, genetic testing neg, right sided stage IIIC ductal breast cancer, s/p B/L mastectomies, JASMYNE reconstruction, adjuvant chemotherapy with DDACT, XRT, and started Arimidex 9/2019.   She is tolerating this well and clinically remains without evidence of disease.   Plan - Continue AI x 10 years, or until 9/2029.   Continue with surveillance: Mammo/Sono: N/A - Patient sees Dr. Jody Dale. Due 3/2024.  GYN:  Saw 10/3/23 - goes yearly.  Bone Density: 5/1/23 - Osteoporosis with T-score spine -2.7 (prior -2.1 11/2020).    Patient reports she saw her dentist since her last visit and has been cleared to start bisphosphonates. Discussed IV vs. SQ. vs PO.  Was concerned about PO given that she did have some esophagitis on recent EGD, however patient states she is asymptomatic and would prefer to try PO 1st.   Rx given for Ibrandronate since monthly.  Colonoscopy: and EGD 9/27/23 (Dr. Chambers) - Internal hemorrhoids, hiatal hernia, and erythema in the stomach c/w non-erosive esophagitis.  On Omeprazole.  Iron deficiency? - Will repeat anemia studies today.  PE in 1 year (patient request - will alternate Q6 months with breast surgeon).   Shwetha WEST (PCP -[place])  Holly Ribera -> Jody Faust - changer to Mckayla Soares (GI - [place])

## 2024-02-21 NOTE — RESULTS/DATA
[FreeTextEntry1] : WBC: 7.91, Hgb: 13.7, Hct: 40.1, MCV:  87.4,  Plts: 250 Ferritin, TSAT, B12, Folate, ESR: pending

## 2024-02-22 DIAGNOSIS — Z08 ENCOUNTER FOR FOLLOW-UP EXAMINATION AFTER COMPLETED TREATMENT FOR MALIGNANT NEOPLASM: ICD-10-CM

## 2024-02-22 DIAGNOSIS — Z51.81 ENCOUNTER FOR THERAPEUTIC DRUG LEVEL MONITORING: ICD-10-CM

## 2024-02-22 DIAGNOSIS — M81.8 OTHER OSTEOPOROSIS WITHOUT CURRENT PATHOLOGICAL FRACTURE: ICD-10-CM

## 2024-05-22 ENCOUNTER — APPOINTMENT (OUTPATIENT)
Dept: FAMILY MEDICINE | Facility: CLINIC | Age: 54
End: 2024-05-22

## 2024-06-23 DIAGNOSIS — A49.9 URINARY TRACT INFECTION, SITE NOT SPECIFIED: ICD-10-CM

## 2024-06-23 DIAGNOSIS — N39.0 URINARY TRACT INFECTION, SITE NOT SPECIFIED: ICD-10-CM

## 2024-06-23 RX ORDER — CIPROFLOXACIN HYDROCHLORIDE 250 MG/1
250 TABLET, FILM COATED ORAL
Qty: 6 | Refills: 0 | Status: ACTIVE | COMMUNITY
Start: 2024-06-23 | End: 1900-01-01

## 2024-08-13 DIAGNOSIS — R05.9 COUGH, UNSPECIFIED: ICD-10-CM

## 2024-08-13 RX ORDER — AMOXICILLIN 500 MG/1
500 TABLET, FILM COATED ORAL
Qty: 20 | Refills: 0 | Status: ACTIVE | COMMUNITY
Start: 2024-08-13 | End: 1900-01-01

## 2024-09-19 ENCOUNTER — OUTPATIENT (OUTPATIENT)
Dept: OUTPATIENT SERVICES | Facility: HOSPITAL | Age: 54
LOS: 1 days | Discharge: HOME | End: 2024-09-19

## 2024-09-19 DIAGNOSIS — Z90.13 ACQUIRED ABSENCE OF BILATERAL BREASTS AND NIPPLES: Chronic | ICD-10-CM

## 2024-09-19 DIAGNOSIS — Z98.890 OTHER SPECIFIED POSTPROCEDURAL STATES: Chronic | ICD-10-CM

## 2024-09-19 DIAGNOSIS — C50.911 MALIGNANT NEOPLASM OF UNSPECIFIED SITE OF RIGHT FEMALE BREAST: ICD-10-CM

## 2024-10-07 ENCOUNTER — APPOINTMENT (OUTPATIENT)
Dept: HEMATOLOGY ONCOLOGY | Facility: CLINIC | Age: 54
End: 2024-10-07

## 2024-11-18 ENCOUNTER — RESULT REVIEW (OUTPATIENT)
Age: 54
End: 2024-11-18

## 2024-11-18 ENCOUNTER — APPOINTMENT (OUTPATIENT)
Dept: HEMATOLOGY ONCOLOGY | Facility: CLINIC | Age: 54
End: 2024-11-18
Payer: COMMERCIAL

## 2024-11-18 VITALS
HEART RATE: 75 BPM | TEMPERATURE: 98 F | OXYGEN SATURATION: 99 % | DIASTOLIC BLOOD PRESSURE: 87 MMHG | HEIGHT: 66 IN | SYSTOLIC BLOOD PRESSURE: 137 MMHG | BODY MASS INDEX: 26.12 KG/M2 | WEIGHT: 162.5 LBS

## 2024-11-18 DIAGNOSIS — Z00.00 ENCOUNTER FOR GENERAL ADULT MEDICAL EXAMINATION W/OUT ABNORMAL FINDINGS: ICD-10-CM

## 2024-11-18 DIAGNOSIS — M79.601 PAIN IN RIGHT ARM: ICD-10-CM

## 2024-11-18 DIAGNOSIS — E61.1 IRON DEFICIENCY: ICD-10-CM

## 2024-11-18 DIAGNOSIS — C50.911 MALIGNANT NEOPLASM OF UNSPECIFIED SITE OF RIGHT FEMALE BREAST: ICD-10-CM

## 2024-11-18 DIAGNOSIS — Z51.81 ENCOUNTER FOR THERAPEUTIC DRUG LVL MONITORING: ICD-10-CM

## 2024-11-18 DIAGNOSIS — R05.9 COUGH, UNSPECIFIED: ICD-10-CM

## 2024-11-18 DIAGNOSIS — Z01.419 ENCOUNTER FOR GYNECOLOGICAL EXAMINATION (GENERAL) (ROUTINE) W/OUT ABNORMAL FINDINGS: ICD-10-CM

## 2024-11-18 DIAGNOSIS — C77.3 MALIGNANT NEOPLASM OF UNSPECIFIED SITE OF RIGHT FEMALE BREAST: ICD-10-CM

## 2024-11-18 DIAGNOSIS — M81.8 OTHER OSTEOPOROSIS W/OUT CURRENT PATHOLOGICAL FRACTURE: ICD-10-CM

## 2024-11-18 DIAGNOSIS — R79.89 OTHER SPECIFIED ABNORMAL FINDINGS OF BLOOD CHEMISTRY: ICD-10-CM

## 2024-11-18 DIAGNOSIS — Z79.899 ENCOUNTER FOR THERAPEUTIC DRUG LVL MONITORING: ICD-10-CM

## 2024-11-18 DIAGNOSIS — M79.89 PAIN IN RIGHT ARM: ICD-10-CM

## 2024-11-18 LAB
BASOPHILS # BLD AUTO: 0.03 K/UL — SIGNIFICANT CHANGE UP (ref 0–0.2)
BASOPHILS NFR BLD AUTO: 0.4 % — SIGNIFICANT CHANGE UP (ref 0–2)
EOSINOPHIL # BLD AUTO: 0.06 K/UL — SIGNIFICANT CHANGE UP (ref 0–0.5)
EOSINOPHIL NFR BLD AUTO: 0.9 % — SIGNIFICANT CHANGE UP (ref 0–6)
HCT VFR BLD CALC: 39.9 % — SIGNIFICANT CHANGE UP (ref 34.5–45)
HGB BLD-MCNC: 14.3 G/DL — SIGNIFICANT CHANGE UP (ref 11.5–15.5)
IMM GRANULOCYTES NFR BLD AUTO: 0.3 % — SIGNIFICANT CHANGE UP (ref 0–0.9)
LYMPHOCYTES # BLD AUTO: 1.02 K/UL — SIGNIFICANT CHANGE UP (ref 1–3.3)
LYMPHOCYTES # BLD AUTO: 15.2 % — SIGNIFICANT CHANGE UP (ref 13–44)
MCHC RBC-ENTMCNC: 31.7 PG — SIGNIFICANT CHANGE UP (ref 27–34)
MCHC RBC-ENTMCNC: 35.8 G/DL — SIGNIFICANT CHANGE UP (ref 32–36)
MCV RBC AUTO: 88.5 FL — SIGNIFICANT CHANGE UP (ref 80–100)
MONOCYTES # BLD AUTO: 0.62 K/UL — SIGNIFICANT CHANGE UP (ref 0–0.9)
MONOCYTES NFR BLD AUTO: 9.3 % — SIGNIFICANT CHANGE UP (ref 2–14)
NEUTROPHILS # BLD AUTO: 4.95 K/UL — SIGNIFICANT CHANGE UP (ref 1.8–7.4)
NEUTROPHILS NFR BLD AUTO: 73.9 % — SIGNIFICANT CHANGE UP (ref 43–77)
NRBC # BLD: 0 /100 WBCS — SIGNIFICANT CHANGE UP (ref 0–0)
NRBC BLD-RTO: 0 /100 WBCS — SIGNIFICANT CHANGE UP (ref 0–0)
PLATELET # BLD AUTO: 217 K/UL — SIGNIFICANT CHANGE UP (ref 150–400)
RBC # BLD: 4.51 M/UL — SIGNIFICANT CHANGE UP (ref 3.8–5.2)
RBC # FLD: 11.9 % — SIGNIFICANT CHANGE UP (ref 10.3–14.5)
WBC # BLD: 6.7 K/UL — SIGNIFICANT CHANGE UP (ref 3.8–10.5)
WBC # FLD AUTO: 6.7 K/UL — SIGNIFICANT CHANGE UP (ref 3.8–10.5)

## 2024-11-18 PROCEDURE — G2211 COMPLEX E/M VISIT ADD ON: CPT

## 2024-11-18 PROCEDURE — 99213 OFFICE O/P EST LOW 20 MIN: CPT

## 2024-11-18 RX ORDER — ONDANSETRON 8 MG/1
8 TABLET, ORALLY DISINTEGRATING ORAL
Qty: 8 | Refills: 0 | Status: DISCONTINUED | COMMUNITY
Start: 2024-10-15

## 2024-11-18 RX ORDER — AMLODIPINE BESYLATE 5 MG/1
5 TABLET ORAL
Qty: 90 | Refills: 0 | Status: ACTIVE | COMMUNITY
Start: 2024-05-26

## 2024-11-18 RX ORDER — AMOXICILLIN 500 MG/1
500 CAPSULE ORAL
Qty: 20 | Refills: 0 | Status: COMPLETED | COMMUNITY
Start: 2024-08-13

## 2024-11-19 DIAGNOSIS — E61.1 IRON DEFICIENCY: ICD-10-CM

## 2024-11-19 DIAGNOSIS — Z51.81 ENCOUNTER FOR THERAPEUTIC DRUG LEVEL MONITORING: ICD-10-CM

## 2024-11-19 LAB
ALBUMIN SERPL ELPH-MCNC: 4.9 G/DL
ALP BLD-CCNC: 99 U/L
ALT SERPL-CCNC: 11 U/L
ANION GAP SERPL CALC-SCNC: 13 MMOL/L
APPEARANCE: CLEAR
AST SERPL-CCNC: 21 U/L
BILIRUB SERPL-MCNC: 0.9 MG/DL
BILIRUBIN URINE: NEGATIVE
BLOOD URINE: NEGATIVE
BUN SERPL-MCNC: 11 MG/DL
CALCIUM SERPL-MCNC: 9.9 MG/DL
CHLORIDE SERPL-SCNC: 96 MMOL/L
CHOLEST SERPL-MCNC: 203 MG/DL
CO2 SERPL-SCNC: 25 MMOL/L
COLOR: YELLOW
CREAT SERPL-MCNC: 0.53 MG/DL
EGFR: 110 ML/MIN/1.73M2
ERYTHROCYTE [SEDIMENTATION RATE] IN BLOOD BY WESTERGREN METHOD: 12 MM/HR
ESTIMATED AVERAGE GLUCOSE: 94 MG/DL
FERRITIN SERPL-MCNC: 54 NG/ML
FOLATE SERPL-MCNC: >20 NG/ML
GLUCOSE QUALITATIVE U: NEGATIVE MG/DL
GLUCOSE SERPL-MCNC: 89 MG/DL
HBA1C MFR BLD HPLC: 4.9 %
HDLC SERPL-MCNC: 76 MG/DL
IRON SATN MFR SERPL: 20 %
IRON SERPL-MCNC: 79 UG/DL
KETONES URINE: NEGATIVE MG/DL
LDLC SERPL CALC-MCNC: 110 MG/DL
LEUKOCYTE ESTERASE URINE: NEGATIVE
NITRITE URINE: NEGATIVE
NONHDLC SERPL-MCNC: 127 MG/DL
PH URINE: 6.5
POTASSIUM SERPL-SCNC: 4.4 MMOL/L
PROT SERPL-MCNC: 7.7 G/DL
PROTEIN URINE: NEGATIVE MG/DL
SODIUM SERPL-SCNC: 135 MMOL/L
SPECIFIC GRAVITY URINE: <1.005
TIBC SERPL-MCNC: 386 UG/DL
TRIGL SERPL-MCNC: 99 MG/DL
TSH SERPL-ACNC: 2.28 UIU/ML
UIBC SERPL-MCNC: 308 UG/DL
UROBILINOGEN URINE: 0.2 MG/DL
VIT B12 SERPL-MCNC: 672 PG/ML

## 2024-12-24 PROBLEM — F10.90 ALCOHOL USE: Status: ACTIVE | Noted: 2019-01-09

## 2025-01-10 ENCOUNTER — EMERGENCY (EMERGENCY)
Facility: HOSPITAL | Age: 55
LOS: 0 days | Discharge: HOME | End: 2025-01-10
Attending: STUDENT IN AN ORGANIZED HEALTH CARE EDUCATION/TRAINING PROGRAM
Payer: COMMERCIAL

## 2025-01-10 VITALS
HEIGHT: 66 IN | DIASTOLIC BLOOD PRESSURE: 96 MMHG | HEART RATE: 107 BPM | TEMPERATURE: 98 F | OXYGEN SATURATION: 100 % | SYSTOLIC BLOOD PRESSURE: 138 MMHG | RESPIRATION RATE: 18 BRPM

## 2025-01-10 VITALS
RESPIRATION RATE: 16 BRPM | OXYGEN SATURATION: 100 % | SYSTOLIC BLOOD PRESSURE: 141 MMHG | DIASTOLIC BLOOD PRESSURE: 89 MMHG | HEART RATE: 80 BPM

## 2025-01-10 DIAGNOSIS — T78.40XA ALLERGY, UNSPECIFIED, INITIAL ENCOUNTER: ICD-10-CM

## 2025-01-10 DIAGNOSIS — Z98.890 OTHER SPECIFIED POSTPROCEDURAL STATES: Chronic | ICD-10-CM

## 2025-01-10 DIAGNOSIS — T78.1XXA OTHER ADVERSE FOOD REACTIONS, NOT ELSEWHERE CLASSIFIED, INITIAL ENCOUNTER: ICD-10-CM

## 2025-01-10 DIAGNOSIS — Z90.13 ACQUIRED ABSENCE OF BILATERAL BREASTS AND NIPPLES: Chronic | ICD-10-CM

## 2025-01-10 DIAGNOSIS — R21 RASH AND OTHER NONSPECIFIC SKIN ERUPTION: ICD-10-CM

## 2025-01-10 DIAGNOSIS — Z85.3 PERSONAL HISTORY OF MALIGNANT NEOPLASM OF BREAST: ICD-10-CM

## 2025-01-10 DIAGNOSIS — L50.9 URTICARIA, UNSPECIFIED: ICD-10-CM

## 2025-01-10 PROCEDURE — 99283 EMERGENCY DEPT VISIT LOW MDM: CPT

## 2025-01-10 PROCEDURE — 99284 EMERGENCY DEPT VISIT MOD MDM: CPT

## 2025-01-10 RX ORDER — PREDNISONE 5 MG
50 TABLET ORAL ONCE
Refills: 0 | Status: COMPLETED | OUTPATIENT
Start: 2025-01-10 | End: 2025-01-10

## 2025-01-10 RX ORDER — FAMOTIDINE 20 MG/1
1 TABLET, FILM COATED ORAL
Qty: 14 | Refills: 0
Start: 2025-01-10 | End: 2025-01-23

## 2025-01-10 RX ORDER — DIPHENHYDRAMINE HCL 25 MG
50 TABLET ORAL ONCE
Refills: 0 | Status: COMPLETED | OUTPATIENT
Start: 2025-01-10 | End: 2025-01-10

## 2025-01-10 RX ORDER — DIPHENHYDRAMINE HCL 25 MG
1 TABLET ORAL
Qty: 40 | Refills: 0
Start: 2025-01-10

## 2025-01-10 RX ORDER — FAMOTIDINE 20 MG/1
20 TABLET, FILM COATED ORAL ONCE
Refills: 0 | Status: COMPLETED | OUTPATIENT
Start: 2025-01-10 | End: 2025-01-10

## 2025-01-10 RX ORDER — PREDNISONE 5 MG
1 TABLET ORAL
Qty: 4 | Refills: 0
Start: 2025-01-10 | End: 2025-01-13

## 2025-01-10 RX ADMIN — Medication 50 MILLIGRAM(S): at 13:59

## 2025-01-10 RX ADMIN — FAMOTIDINE 20 MILLIGRAM(S): 20 TABLET, FILM COATED ORAL at 14:26

## 2025-01-10 NOTE — ED ADULT NURSE NOTE - OBJECTIVE STATEMENT
pt presenting w/itchy rash, denies previous allergies except latex, states a patient brought a box of macaroons for them and she had a chocolate one then  had a reaction. Denies throat pain, itchiness, or SOB. States she already feels better. Given oral meds per MD orders

## 2025-01-10 NOTE — ED STATDOCS - PROGRESS NOTE DETAILS
55 y/o F with PMH of breast CA presents with concern for allergic reaction. Pt was eating a macaroon today, and started to experience diffuse itching and swelling since consumption. Denies SOB, tongue swelling, SOB, nausea, vomiting. PE: NAD. +diffuse urticaria. No tongue swelling, no oropharyngeal erythema/edema. lungs; CTAB. Abdomen; NBS x4 soft, nontender Cardiac: s1s2, RRR. A/P: allergic reaction, no signs of anaphylaxis. Will treat with antihistamines, steroids, expected dc home. - Hi Lloyd PA-C

## 2025-01-10 NOTE — ED STATDOCS - PHYSICAL EXAMINATION
GEN: Patient awake alert NAD.   HEENT: normocephalic, atraumatic,  no uvula edema, no lip edema, tongue edema ,EOMI, no scleral icterus, moist MM  CARDIAC: RRR, S1, S2, no murmur.   PULM: CTA B/L no wheeze, rhonchi, rales.   ABD: soft NT, ND, no rebound no guarding, no CVA tenderness.   MSK: Moving all extremities, no edema. 5/5 strength and full ROM in all extremities.     NEURO: A&Ox3, gait normal, no focal neurological deficits  SKIN: warm, dry,   Diffuse erythematous, blanching pruritic rash diffusely over all extremities and torso

## 2025-01-10 NOTE — ED STATDOCS - NSICDXPASTSURGICALHX_GEN_ALL_CORE_FT
PAST SURGICAL HISTORY:  H/O bilateral mastectomy     S/P bilateral breast reduction 2007    S/P TRAM (transverse rectus abdominis muscle) flap breast reconstruction

## 2025-01-10 NOTE — ED STATDOCS - CLINICAL SUMMARY MEDICAL DECISION MAKING FREE TEXT BOX
54-year-old female past medical history of hypertension, breast CA on maintenance treatment presents ED complaining of allergic reaction.  Patient works as a PACU nurse and ate a macaroon unknown what it contained and developed diffuse urticarial and pruritic rash but no tongue swelling, lip swelling, nausea or shortness of breath.  patient with no signs of angioedema or anaphylaxis, only symptom is a diffuse urticarial pruritic rash and hemodynamically stable.  Plan to treat for allergic reaction with Benadryl and steroids and likely discharge.

## 2025-01-10 NOTE — ED STATDOCS - PATIENT PORTAL LINK FT
You can access the FollowMyHealth Patient Portal offered by [facility] by registering at the following website: [email or web address]. By joining [facility]’s FollowMyHealth portal, you will also be able to view your health information using other applications (apps) compatible with our system.

## 2025-01-10 NOTE — ED ADULT TRIAGE NOTE - CHIEF COMPLAINT QUOTE
Pt presents to ED c/o allergic rx to unknown allergen. pt states she ate lunch and then began to feel itchy and hot. Pt presents w/ diffuse rash to face, arms and chest. Denies throat swelling, difficulty swallowing or n/v/d. PMhx latex allergy.

## 2025-01-10 NOTE — ED STATDOCS - NSICDXPASTMEDICALHX_GEN_ALL_CORE_FT
PAST MEDICAL HISTORY:  Malignant neoplasm of breast     Obesity      <<-----Click here for Discharge Medication Review

## 2025-04-10 NOTE — ASU PATIENT PROFILE, ADULT - PROVIDER NOTIFICATION
Medication: allopurinol 100 mg passed protocol.   Last office visit date: 03/11/2025  Next appointment scheduled?: Yes   Number of refills given: 1  Last refill: 10/17/2024  Next office visit: 09/15/2025    Medication: metformin 500 mg passed protocol.   Last office visit date: 03/11/2025  Next appointment scheduled?: Yes   Number of refills given: 1  Last refill: 09/13/2024  Next office visit: 09/15/2025    
Declines

## 2025-09-15 ENCOUNTER — APPOINTMENT (OUTPATIENT)
Dept: RADIOLOGY | Facility: CLINIC | Age: 55
End: 2025-09-15
Payer: COMMERCIAL

## 2025-09-15 PROCEDURE — 77085 DXA BONE DENSITY AXL VRT FX: CPT | Mod: 26
